# Patient Record
Sex: MALE | Race: BLACK OR AFRICAN AMERICAN | Employment: FULL TIME | RURAL
[De-identification: names, ages, dates, MRNs, and addresses within clinical notes are randomized per-mention and may not be internally consistent; named-entity substitution may affect disease eponyms.]

---

## 2024-04-11 ENCOUNTER — APPOINTMENT (OUTPATIENT)
Facility: HOSPITAL | Age: 49
End: 2024-04-11
Payer: COMMERCIAL

## 2024-04-11 ENCOUNTER — HOSPITAL ENCOUNTER (EMERGENCY)
Facility: HOSPITAL | Age: 49
Discharge: HOME | End: 2024-04-11
Attending: EMERGENCY MEDICINE
Payer: COMMERCIAL

## 2024-04-11 VITALS
HEART RATE: 69 BPM | HEIGHT: 73 IN | DIASTOLIC BLOOD PRESSURE: 86 MMHG | WEIGHT: 210 LBS | SYSTOLIC BLOOD PRESSURE: 134 MMHG | TEMPERATURE: 98 F | BODY MASS INDEX: 27.83 KG/M2 | OXYGEN SATURATION: 100 % | RESPIRATION RATE: 17 BRPM

## 2024-04-11 DIAGNOSIS — R55 NEAR SYNCOPE: Primary | ICD-10-CM

## 2024-04-11 LAB
ALBUMIN SERPL-MCNC: 4 G/DL (ref 3.5–5)
ALBUMIN/GLOB SERPL: 1 (ref 1.1–2.2)
ALP SERPL-CCNC: 87 U/L (ref 45–117)
ALT SERPL-CCNC: 29 U/L (ref 12–78)
ANION GAP SERPL CALC-SCNC: 9 MMOL/L (ref 5–15)
AST SERPL-CCNC: 23 U/L (ref 15–37)
BASOPHILS # BLD: 0.1 K/UL (ref 0–0.1)
BASOPHILS NFR BLD: 1 % (ref 0–1)
BILIRUB SERPL-MCNC: 0.4 MG/DL (ref 0.2–1)
BUN SERPL-MCNC: 18 MG/DL (ref 6–20)
BUN/CREAT SERPL: 17 (ref 12–20)
CALCIUM SERPL-MCNC: 8.7 MG/DL (ref 8.5–10.1)
CHLORIDE SERPL-SCNC: 97 MMOL/L (ref 97–108)
CO2 SERPL-SCNC: 27 MMOL/L (ref 21–32)
CREAT SERPL-MCNC: 1.05 MG/DL (ref 0.7–1.3)
DIFFERENTIAL METHOD BLD: ABNORMAL
EOSINOPHIL # BLD: 0.3 K/UL (ref 0–0.4)
EOSINOPHIL NFR BLD: 3 % (ref 0–7)
ERYTHROCYTE [DISTWIDTH] IN BLOOD BY AUTOMATED COUNT: 13.6 % (ref 11.5–14.5)
GLOBULIN SER CALC-MCNC: 4.1 G/DL (ref 2–4)
GLUCOSE SERPL-MCNC: 122 MG/DL (ref 65–100)
HCT VFR BLD AUTO: 44 % (ref 36.6–50.3)
HGB BLD-MCNC: 14.8 G/DL (ref 12.1–17)
IMM GRANULOCYTES # BLD AUTO: 0.1 K/UL (ref 0–0.04)
IMM GRANULOCYTES NFR BLD AUTO: 1 % (ref 0–0.5)
LYMPHOCYTES # BLD: 2.9 K/UL (ref 0.8–3.5)
LYMPHOCYTES NFR BLD: 38 % (ref 12–49)
MCH RBC QN AUTO: 26.4 PG (ref 26–34)
MCHC RBC AUTO-ENTMCNC: 33.6 G/DL (ref 30–36.5)
MCV RBC AUTO: 78.4 FL (ref 80–99)
MONOCYTES # BLD: 0.6 K/UL (ref 0–1)
MONOCYTES NFR BLD: 8 % (ref 5–13)
NEUTS SEG # BLD: 3.7 K/UL (ref 1.8–8)
NEUTS SEG NFR BLD: 49 % (ref 32–75)
NRBC # BLD: 0 K/UL (ref 0–0.01)
NRBC BLD-RTO: 0 PER 100 WBC
PLATELET # BLD AUTO: 237 K/UL (ref 150–400)
PMV BLD AUTO: 10.1 FL (ref 8.9–12.9)
POTASSIUM SERPL-SCNC: 3.8 MMOL/L (ref 3.5–5.1)
PROT SERPL-MCNC: 8.1 G/DL (ref 6.4–8.2)
RBC # BLD AUTO: 5.61 M/UL (ref 4.1–5.7)
SODIUM SERPL-SCNC: 133 MMOL/L (ref 136–145)
TROPONIN I SERPL HS-MCNC: 87 NG/L (ref 0–76)
TROPONIN I SERPL HS-MCNC: 90 NG/L (ref 0–76)
TSH SERPL DL<=0.05 MIU/L-ACNC: 0.94 UIU/ML (ref 0.36–3.74)
WBC # BLD AUTO: 7.6 K/UL (ref 4.1–11.1)

## 2024-04-11 PROCEDURE — 84443 ASSAY THYROID STIM HORMONE: CPT

## 2024-04-11 PROCEDURE — 80053 COMPREHEN METABOLIC PANEL: CPT

## 2024-04-11 PROCEDURE — 36415 COLL VENOUS BLD VENIPUNCTURE: CPT

## 2024-04-11 PROCEDURE — 84484 ASSAY OF TROPONIN QUANT: CPT

## 2024-04-11 PROCEDURE — 93005 ELECTROCARDIOGRAM TRACING: CPT | Performed by: EMERGENCY MEDICINE

## 2024-04-11 PROCEDURE — 70450 CT HEAD/BRAIN W/O DYE: CPT

## 2024-04-11 PROCEDURE — 85025 COMPLETE CBC W/AUTO DIFF WBC: CPT

## 2024-04-11 PROCEDURE — 99284 EMERGENCY DEPT VISIT MOD MDM: CPT

## 2024-04-11 ASSESSMENT — PAIN SCALES - GENERAL: PAINLEVEL_OUTOF10: 0

## 2024-04-11 ASSESSMENT — LIFESTYLE VARIABLES
HOW MANY STANDARD DRINKS CONTAINING ALCOHOL DO YOU HAVE ON A TYPICAL DAY: PATIENT DOES NOT DRINK
HOW OFTEN DO YOU HAVE A DRINK CONTAINING ALCOHOL: NEVER

## 2024-04-11 ASSESSMENT — PAIN - FUNCTIONAL ASSESSMENT: PAIN_FUNCTIONAL_ASSESSMENT: 0-10

## 2024-04-11 NOTE — ED PROVIDER NOTES
[facility] EMERGENCY DEP  EMERGENCY DEPARTMENT ENCOUNTER       Pt Name: Otis Andrade  MRN: [id number]  Birthdate 1975  Date of evaluation: 4/11/2024  Provider: Kojo Mehta MD   PCP: None, None  Note Started: 1:55 PM 4/11/24     CHIEF COMPLAINT       Chief Complaint   Patient presents with    Hypertension        HISTORY OF PRESENT ILLNESS: 1 or more elements      History From: Patient, History limited by: none     Otis Andrade is a pleasant 48 y.o. male who presents by car for evaluation of hypertension.  Patient awoke at 430 this morning, had breakfast, went to work at [employer].  Patient was loading boxes into trucks, suddenly began feeling \"strange, tingly\" but had no headache, chest pain, difficulty walking.  Told coworkers that he did not feel well and was walking to an area to sit down, a coworker thought that his speech was slurred and that 1 side of his face was not moving normally but he began drinking water and immediately returned to normal.  911 had been called, they evaluated the patient, recommended that he come to the ER for evaluation, I coworker drove him to the ER.  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  Patient now feels back to normal, no symptoms, no discomfort, no dysfunction.  EKG by EMS was normal.     Nursing Notes were all reviewed and agreed with or any disagreements were addressed in the HPI.     REVIEW OF SYSTEMS        Positives and Pertinent negatives as per HPI.    PAST HISTORY     Past Medical History:  History reviewed. No pertinent past medical history.    Past Surgical History:  History reviewed. No pertinent surgical history.    Family History:  History reviewed. No pertinent family history.    Social History:  Social History     Tobacco Use    Smoking status: Never     Passive exposure: Never    Smokeless tobacco: Never   Substance Use Topics    Alcohol use:

## 2024-04-11 NOTE — ED TRIAGE NOTES
Pt arrived with complaint of hypertension.  Pt reports he was at work and between [id number] and he felt lightheaded and his co-worker stated pts speech was slurred and the pts face did not look right. EMS was called and told pt that his blood pressure was extremely elevated and needed to seek medical attention. Pt woke up at 0430 feeling well.  Pt is awake alert and oriented X 4,  BFAST in triage negative.  Pt speaking in full complete sentences  NAD.  Pt denies hx of hypertension and is not on medications for blood pressure.  Pt denies CP/SOB at time of lightheadedness and continues to deny.  Pt educated on ER flow.  Provider notified of this patient

## 2024-04-14 LAB
EKG ATRIAL RATE: 62 BPM
EKG ATRIAL RATE: 68 BPM
EKG DIAGNOSIS: NORMAL
EKG DIAGNOSIS: NORMAL
EKG P AXIS: 60 DEGREES
EKG P AXIS: 68 DEGREES
EKG P-R INTERVAL: 124 MS
EKG P-R INTERVAL: 126 MS
EKG Q-T INTERVAL: 374 MS
EKG Q-T INTERVAL: 398 MS
EKG QRS DURATION: 84 MS
EKG QRS DURATION: 98 MS
EKG QTC CALCULATION (BAZETT): 397 MS
EKG QTC CALCULATION (BAZETT): 403 MS
EKG R AXIS: 19 DEGREES
EKG R AXIS: 27 DEGREES
EKG T AXIS: 28 DEGREES
EKG T AXIS: 30 DEGREES
EKG VENTRICULAR RATE: 62 BPM
EKG VENTRICULAR RATE: 68 BPM

## 2025-01-25 ENCOUNTER — HOSPITAL ENCOUNTER (EMERGENCY)
Facility: HOSPITAL | Age: 50
Discharge: HOME | End: 2025-01-25
Attending: EMERGENCY MEDICINE
Payer: COMMERCIAL

## 2025-01-25 VITALS
SYSTOLIC BLOOD PRESSURE: 167 MMHG | DIASTOLIC BLOOD PRESSURE: 99 MMHG | HEIGHT: 73 IN | TEMPERATURE: 98.2 F | BODY MASS INDEX: 27.83 KG/M2 | OXYGEN SATURATION: 99 % | HEART RATE: 83 BPM | WEIGHT: 210 LBS | RESPIRATION RATE: 14 BRPM

## 2025-01-25 DIAGNOSIS — R03.0 ELEVATED BLOOD PRESSURE READING: ICD-10-CM

## 2025-01-25 DIAGNOSIS — K04.7 DENTAL ABSCESS: Primary | ICD-10-CM

## 2025-01-25 PROCEDURE — 6360000002 HC RX W HCPCS: Performed by: EMERGENCY MEDICINE

## 2025-01-25 PROCEDURE — 99283 EMERGENCY DEPT VISIT LOW MDM: CPT

## 2025-01-25 PROCEDURE — 41800 DRAINAGE OF GUM LESION: CPT

## 2025-01-25 RX ORDER — BUPIVACAINE HYDROCHLORIDE 5 MG/ML
10 INJECTION, SOLUTION EPIDURAL; INTRACAUDAL
Status: COMPLETED | OUTPATIENT
Start: 2025-01-25 | End: 2025-01-25

## 2025-01-25 RX ORDER — IBUPROFEN 600 MG/1
600 TABLET, FILM COATED ORAL EVERY 6 HOURS PRN
Qty: 25 TABLET | Refills: 0 | Status: SHIPPED | OUTPATIENT
Start: 2025-01-25

## 2025-01-25 RX ORDER — CHLORHEXIDINE GLUCONATE ORAL RINSE 1.2 MG/ML
15 SOLUTION DENTAL 2 TIMES DAILY
Qty: 420 ML | Refills: 0 | Status: SHIPPED | OUTPATIENT
Start: 2025-01-25 | End: 2025-02-08

## 2025-01-25 RX ORDER — OXYCODONE HYDROCHLORIDE 5 MG/1
5 TABLET ORAL EVERY 6 HOURS PRN
Qty: 8 TABLET | Refills: 0 | Status: SHIPPED | OUTPATIENT
Start: 2025-01-25 | End: 2025-01-28

## 2025-01-25 RX ADMIN — BUPIVACAINE HYDROCHLORIDE 50 MG: 5 INJECTION, SOLUTION EPIDURAL; INTRACAUDAL; PERINEURAL at 07:58

## 2025-01-25 ASSESSMENT — LIFESTYLE VARIABLES
HOW OFTEN DO YOU HAVE A DRINK CONTAINING ALCOHOL: MONTHLY OR LESS
HOW MANY STANDARD DRINKS CONTAINING ALCOHOL DO YOU HAVE ON A TYPICAL DAY: 1 OR 2

## 2025-01-25 ASSESSMENT — PAIN DESCRIPTION - ORIENTATION: ORIENTATION: UPPER;RIGHT

## 2025-01-25 ASSESSMENT — PAIN DESCRIPTION - LOCATION: LOCATION: MOUTH

## 2025-01-25 ASSESSMENT — PAIN SCALES - GENERAL: PAINLEVEL_OUTOF10: 5

## 2025-01-25 ASSESSMENT — PAIN DESCRIPTION - DESCRIPTORS: DESCRIPTORS: ACHING

## 2025-01-25 NOTE — DISCHARGE INSTRUCTIONS
Emergency Dental Care     [facility]  [address]; [place], [place] [address]  Open M-F: 8AM - 5PM  Main Phone: [phone]  Pediatric Phone: [phone]  $70 for Emergency Care  $60 for first routine care, then pay by sliding scale based upon income.    [facility]  [address]; [place] [address]  Phone: [phone]    [employer]  [facility]: [address]; [place] [address]  [facility]: [address] [place] [address]; [place] [address]  Phone: [phone]  Open Monday - Friday 8AM-4:30 PM  Emergency Walk-In Care: Monday-Thursday 8 AM-12 PM    [facility]  [facility], [facility], [address] [place] [address], 1st Floor  First Come First Service starting at 8:30 AM M-F  Phone: [phone], press 2  Fee: $150 per tooth (x-ray & extractions only)  Pediatrics Phone: [phone], 8-5 M-F    [facility]  [facility], [facility], [address] [place] [address], 2nd Floor, [address]; [place], [place]  First Come First Service starting at 8:30 AM-3 PM M - F    [facility]  [address] [place] [address]  Phone: [phone] or [phone]  Emergency Hours: 9:30AM - 11AM (extractions)  Simple tooth extraction $ per tooth. $75 for x-ray    [facility]  [address] [place] [address]  [facility] Residents only, over the age of 18  Phone: [phone]. Leave message saying you need an appointment to register.  Hours: Tuesday Evenings

## 2025-01-25 NOTE — ED PROVIDER NOTES
EMERGENCY DEPARTMENT HISTORY AND PHYSICAL EXAM      Date: 1/25/2025  Patient Name: Otis Andrade    History of Presenting Illness     Chief Complaint   Patient presents with    Dental Problem       History Provided By: Patient    HPI: Otis Andrade, 49 y.o. male with PMHx as noted below presents emergency department for evaluation of possible dental abscess.  Patient is noted area of swelling and pain on his hard palate adjacent to right upper molars.  Otherwise denies any trismus, fevers or difficulty swallowing    PCP: None, None    No current facility-administered medications for this encounter.     Current Outpatient Medications   Medication Sig Dispense Refill    oxyCODONE (ROXICODONE) 5 MG immediate release tablet Take 1 tablet by mouth every 6 hours as needed for Pain for up to 3 days. Intended supply: 3 days. Take lowest dose possible to manage pain Max Daily Amount: 20 mg 8 tablet 0    ibuprofen (IBU) 600 MG tablet Take 1 tablet by mouth every 6 hours as needed for Pain 25 tablet 0    amoxicillin-clavulanate (AUGMENTIN) 875-125 MG per tablet Take 1 tablet by mouth 2 times daily for 7 days 14 tablet 0    chlorhexidine (PERIDEX) 0.12 % solution Swish and spit 15 mLs 2 times daily for 14 days 420 mL 0       Past History     Past Medical History:  History reviewed. No pertinent past medical history.    Past Surgical History:  History reviewed. No pertinent surgical history.    Family History:  History reviewed. No pertinent family history.    Social History:  Social History     Tobacco Use    Smoking status: Never     Passive exposure: Never    Smokeless tobacco: Never   Substance Use Topics    Alcohol use: Not Currently    Drug use: Never       Allergies:  No Known Allergies      Review of Systems   Review of Systems      Physical Exam   Physical Exam    GENERAL: alert and oriented, no acute distress  EYES: PEERL, No injection, discharge or icterus.  ENT: Mucous membranes pink and moist.  There is area of  purulent drainage was expressed.  Wound probed.   Estimated blood loss: minimal  The procedure took 1-15 minutes, and pt tolerated well.        PROGRESS  Otis Andrade has been counseled regarding his diagnosis.  He verbally conveys understanding and agreement of the signs, symptoms, diagnosis, treatment and prognosis and additionally agrees to follow up as recommended ..  He also agrees with the care-plan and conveys that all of his questions have been answered.  I have also put together some discharge instructions for him that include: 1) educational information regarding their diagnosis, 2) how to care for their diagnosis at home, as well a 3) list of reasons why they would want to return to the ED prior to their follow-up appointment, should their condition change.      Disposition:  home    PLAN:  1. DISCHARGE MEDICATIONS:  Discharge Medication List as of 1/25/2025  8:03 AM             Details   oxyCODONE (ROXICODONE) 5 MG immediate release tablet Take 1 tablet by mouth every 6 hours as needed for Pain for up to 3 days. Intended supply: 3 days. Take lowest dose possible to manage pain Max Daily Amount: 20 mg, Disp-8 tablet, R-0Normal      ibuprofen (IBU) 600 MG tablet Take 1 tablet by mouth every 6 hours as needed for Pain, Disp-25 tablet, R-0Normal      amoxicillin-clavulanate (AUGMENTIN) 875-125 MG per tablet Take 1 tablet by mouth 2 times daily for 7 days, Disp-14 tablet, R-0Normal             DISCONTINUED MEDICATIONS:  Discharge Medication List as of 1/25/2025  8:03 AM          PATIENT REFERRED TO:  Follow Up with:  [facility] Emergency Department  [address]  [facility] [address]  [phone]    If symptoms worsen    [facility]  [address]  [facility] [address]  [phone]  Schedule an appointment as soon as possible for a visit         Return to ED if worse     Diagnosis     Clinical Impression:   1. Dental abscess    2. Elevated blood pressure

## 2025-06-17 ENCOUNTER — APPOINTMENT (OUTPATIENT)
Facility: HOSPITAL | Age: 50
DRG: 066 | End: 2025-06-17
Payer: COMMERCIAL

## 2025-06-17 ENCOUNTER — HOSPITAL ENCOUNTER (INPATIENT)
Facility: HOSPITAL | Age: 50
LOS: 1 days | Discharge: ANOTHER HOSPITAL | DRG: 066 | End: 2025-06-18
Attending: EMERGENCY MEDICINE | Admitting: INTERNAL MEDICINE
Payer: COMMERCIAL

## 2025-06-17 DIAGNOSIS — I63.9 CEREBROVASCULAR ACCIDENT (CVA), UNSPECIFIED MECHANISM (HCC): ICD-10-CM

## 2025-06-17 DIAGNOSIS — R93.0 ABNORMAL CT OF THE HEAD: ICD-10-CM

## 2025-06-17 DIAGNOSIS — H53.9 VISUAL DISTURBANCE: Primary | ICD-10-CM

## 2025-06-17 DIAGNOSIS — R03.0 ELEVATED BLOOD PRESSURE READING: ICD-10-CM

## 2025-06-17 LAB
ALBUMIN SERPL-MCNC: 4.2 G/DL (ref 3.5–5)
ALBUMIN/GLOB SERPL: 1.1 (ref 1.1–2.2)
ALP SERPL-CCNC: 66 U/L (ref 45–117)
ALT SERPL-CCNC: 35 U/L (ref 12–78)
ANION GAP SERPL CALC-SCNC: 10 MMOL/L (ref 2–12)
APPEARANCE UR: CLEAR
AST SERPL-CCNC: 20 U/L (ref 15–37)
BACTERIA URNS QL MICRO: NEGATIVE /HPF
BASOPHILS # BLD: 0.04 K/UL (ref 0–0.1)
BASOPHILS NFR BLD: 0.4 % (ref 0–1)
BILIRUB SERPL-MCNC: 0.7 MG/DL (ref 0.2–1)
BILIRUB UR QL: NEGATIVE
BUN SERPL-MCNC: 15 MG/DL (ref 6–20)
BUN/CREAT SERPL: 14 (ref 12–20)
CALCIUM SERPL-MCNC: 9.4 MG/DL (ref 8.5–10.1)
CHLORIDE SERPL-SCNC: 101 MMOL/L (ref 97–108)
CO2 SERPL-SCNC: 28 MMOL/L (ref 21–32)
COLOR UR: NORMAL
CREAT SERPL-MCNC: 1.04 MG/DL (ref 0.7–1.3)
DIFFERENTIAL METHOD BLD: ABNORMAL
EOSINOPHIL # BLD: 0.15 K/UL (ref 0–0.4)
EOSINOPHIL NFR BLD: 1.6 % (ref 0–7)
EPITH CASTS URNS QL MICRO: NORMAL /LPF
ERYTHROCYTE [DISTWIDTH] IN BLOOD BY AUTOMATED COUNT: 14.2 % (ref 11.5–14.5)
GLOBULIN SER CALC-MCNC: 3.9 G/DL (ref 2–4)
GLUCOSE BLD STRIP.AUTO-MCNC: 112 MG/DL (ref 65–117)
GLUCOSE SERPL-MCNC: 107 MG/DL (ref 65–100)
GLUCOSE UR STRIP.AUTO-MCNC: NEGATIVE MG/DL
HCT VFR BLD AUTO: 42.4 % (ref 36.6–50.3)
HGB BLD-MCNC: 14.1 G/DL (ref 12.1–17)
HGB UR QL STRIP: NEGATIVE
IMM GRANULOCYTES # BLD AUTO: 0.03 K/UL (ref 0–0.04)
IMM GRANULOCYTES NFR BLD AUTO: 0.3 % (ref 0–0.5)
KETONES UR QL STRIP.AUTO: NEGATIVE MG/DL
LEUKOCYTE ESTERASE UR QL STRIP.AUTO: NEGATIVE
LYMPHOCYTES # BLD: 2.66 K/UL (ref 0.8–3.5)
LYMPHOCYTES NFR BLD: 27.6 % (ref 12–49)
MAGNESIUM SERPL-MCNC: 1.9 MG/DL (ref 1.6–2.4)
MCH RBC QN AUTO: 26.5 PG (ref 26–34)
MCHC RBC AUTO-ENTMCNC: 33.3 G/DL (ref 30–36.5)
MCV RBC AUTO: 79.5 FL (ref 80–99)
MONOCYTES # BLD: 0.85 K/UL (ref 0–1)
MONOCYTES NFR BLD: 8.8 % (ref 5–13)
NEUTS SEG # BLD: 5.91 K/UL (ref 1.8–8)
NEUTS SEG NFR BLD: 61.3 % (ref 32–75)
NITRITE UR QL STRIP.AUTO: NEGATIVE
NRBC # BLD: 0 K/UL (ref 0–0.01)
NRBC BLD-RTO: 0 PER 100 WBC
PH UR STRIP: 6 (ref 5–8)
PLATELET # BLD AUTO: 238 K/UL (ref 150–400)
PMV BLD AUTO: 10.2 FL (ref 8.9–12.9)
POTASSIUM SERPL-SCNC: 4.2 MMOL/L (ref 3.5–5.1)
PROT SERPL-MCNC: 8.1 G/DL (ref 6.4–8.2)
PROT UR STRIP-MCNC: NEGATIVE MG/DL
RBC # BLD AUTO: 5.33 M/UL (ref 4.1–5.7)
RBC #/AREA URNS HPF: NORMAL /HPF (ref 0–5)
SERVICE CMNT-IMP: NORMAL
SODIUM SERPL-SCNC: 139 MMOL/L (ref 136–145)
SP GR UR REFRACTOMETRY: 1.02 (ref 1–1.03)
TROPONIN I SERPL HS-MCNC: 79 NG/L (ref 0–76)
URINE CULTURE IF INDICATED: NORMAL
UROBILINOGEN UR QL STRIP.AUTO: 0.2 EU/DL (ref 0.2–1)
WBC # BLD AUTO: 9.6 K/UL (ref 4.1–11.1)
WBC URNS QL MICRO: NORMAL /HPF (ref 0–4)

## 2025-06-17 PROCEDURE — 36415 COLL VENOUS BLD VENIPUNCTURE: CPT

## 2025-06-17 PROCEDURE — 6370000000 HC RX 637 (ALT 250 FOR IP): Performed by: EMERGENCY MEDICINE

## 2025-06-17 PROCEDURE — 99285 EMERGENCY DEPT VISIT HI MDM: CPT

## 2025-06-17 PROCEDURE — 80053 COMPREHEN METABOLIC PANEL: CPT

## 2025-06-17 PROCEDURE — 71046 X-RAY EXAM CHEST 2 VIEWS: CPT

## 2025-06-17 PROCEDURE — 82962 GLUCOSE BLOOD TEST: CPT

## 2025-06-17 PROCEDURE — 93005 ELECTROCARDIOGRAM TRACING: CPT | Performed by: EMERGENCY MEDICINE

## 2025-06-17 PROCEDURE — 70450 CT HEAD/BRAIN W/O DYE: CPT

## 2025-06-17 PROCEDURE — 81001 URINALYSIS AUTO W/SCOPE: CPT

## 2025-06-17 PROCEDURE — 85025 COMPLETE CBC W/AUTO DIFF WBC: CPT

## 2025-06-17 PROCEDURE — 84484 ASSAY OF TROPONIN QUANT: CPT

## 2025-06-17 PROCEDURE — 83735 ASSAY OF MAGNESIUM: CPT

## 2025-06-17 RX ORDER — ASPIRIN 81 MG/1
324 TABLET, CHEWABLE ORAL ONCE
Status: COMPLETED | OUTPATIENT
Start: 2025-06-17 | End: 2025-06-17

## 2025-06-17 RX ADMIN — ASPIRIN 324 MG: 81 TABLET, CHEWABLE ORAL at 23:38

## 2025-06-17 ASSESSMENT — LIFESTYLE VARIABLES
HOW MANY STANDARD DRINKS CONTAINING ALCOHOL DO YOU HAVE ON A TYPICAL DAY: 1 OR 2
HOW MANY STANDARD DRINKS CONTAINING ALCOHOL DO YOU HAVE ON A TYPICAL DAY: 1 OR 2
HOW OFTEN DO YOU HAVE A DRINK CONTAINING ALCOHOL: MONTHLY OR LESS

## 2025-06-17 ASSESSMENT — PAIN SCALES - GENERAL: PAINLEVEL_OUTOF10: 0

## 2025-06-17 ASSESSMENT — VISUAL ACUITY
OU: 20/20
OD: 20/20
OS: 20/20

## 2025-06-17 ASSESSMENT — PAIN - FUNCTIONAL ASSESSMENT: PAIN_FUNCTIONAL_ASSESSMENT: 0-10

## 2025-06-17 NOTE — ED TRIAGE NOTES
Pt reports that he was at work in the [employer] around 1845 and developed an anxious, jittery feeling with blurred vision that lasted x 15-20 minutes. Denies blurred vision/anxiety at this time. Reports that he still feels jittery.

## 2025-06-18 ENCOUNTER — HOSPITAL ENCOUNTER (INPATIENT)
Facility: HOSPITAL | Age: 50
LOS: 2 days | Discharge: HOME | DRG: 064 | End: 2025-06-20
Attending: STUDENT IN AN ORGANIZED HEALTH CARE EDUCATION/TRAINING PROGRAM | Admitting: STUDENT IN AN ORGANIZED HEALTH CARE EDUCATION/TRAINING PROGRAM
Payer: COMMERCIAL

## 2025-06-18 ENCOUNTER — APPOINTMENT (OUTPATIENT)
Facility: HOSPITAL | Age: 50
DRG: 066 | End: 2025-06-18
Payer: COMMERCIAL

## 2025-06-18 VITALS
SYSTOLIC BLOOD PRESSURE: 159 MMHG | OXYGEN SATURATION: 97 % | BODY MASS INDEX: 27.95 KG/M2 | DIASTOLIC BLOOD PRESSURE: 95 MMHG | WEIGHT: 210.9 LBS | HEIGHT: 73 IN | TEMPERATURE: 97.9 F | HEART RATE: 62 BPM | RESPIRATION RATE: 18 BRPM

## 2025-06-18 DIAGNOSIS — D49.89 CARDIAC TUMOR: Primary | ICD-10-CM

## 2025-06-18 PROBLEM — H53.9 VISUAL DISTURBANCE: Status: ACTIVE | Noted: 2025-06-18

## 2025-06-18 PROBLEM — I50.810 RIGHT HEART FAILURE (HCC): Status: ACTIVE | Noted: 2025-06-18

## 2025-06-18 PROBLEM — E78.5 HYPERLIPIDEMIA: Status: ACTIVE | Noted: 2025-06-18

## 2025-06-18 PROBLEM — I34.0 MITRAL INSUFFICIENCY: Status: ACTIVE | Noted: 2025-06-18

## 2025-06-18 PROBLEM — I63.9 ACUTE CEREBROVASCULAR ACCIDENT (CVA) (HCC): Status: ACTIVE | Noted: 2025-06-18

## 2025-06-18 PROBLEM — I05.8 MITRAL VALVE MASS: Status: ACTIVE | Noted: 2025-06-18

## 2025-06-18 PROBLEM — N50.89 TESTICULAR MASS: Status: RESOLVED | Noted: 2022-08-08 | Resolved: 2025-06-18

## 2025-06-18 LAB
ANION GAP SERPL CALC-SCNC: 10 MMOL/L (ref 2–12)
BUN SERPL-MCNC: 12 MG/DL (ref 6–20)
BUN/CREAT SERPL: 13 (ref 12–20)
CALCIUM SERPL-MCNC: 9 MG/DL (ref 8.5–10.1)
CHLORIDE SERPL-SCNC: 104 MMOL/L (ref 97–108)
CHOLEST SERPL-MCNC: 195 MG/DL
CO2 SERPL-SCNC: 26 MMOL/L (ref 21–32)
CREAT SERPL-MCNC: 0.93 MG/DL (ref 0.7–1.3)
ECHO AO ROOT DIAM: 3.5 CM
ECHO AO ROOT INDEX: 1.59 CM/M2
ECHO AR MAX VEL PISA: 4.8 M/S
ECHO AV PEAK GRADIENT: 7 MMHG
ECHO AV PEAK VELOCITY: 1.3 M/S
ECHO AV REGURGITANT PHT: 613.5 MS
ECHO BSA: 2.24 M2
ECHO EST RA PRESSURE: 3 MMHG
ECHO LA DIAMETER INDEX: 1.55 CM/M2
ECHO LA DIAMETER: 3.4 CM
ECHO LA TO AORTIC ROOT RATIO: 0.97
ECHO LA VOL A-L A2C: 92 ML (ref 18–58)
ECHO LA VOL A-L A4C: 75 ML (ref 18–58)
ECHO LA VOL MOD A2C: 90 ML (ref 18–58)
ECHO LA VOL MOD A4C: 73 ML (ref 18–58)
ECHO LA VOLUME AREA LENGTH: 86 ML
ECHO LA VOLUME INDEX A-L A2C: 42 ML/M2 (ref 16–34)
ECHO LA VOLUME INDEX A-L A4C: 34 ML/M2 (ref 16–34)
ECHO LA VOLUME INDEX AREA LENGTH: 39 ML/M2 (ref 16–34)
ECHO LA VOLUME INDEX MOD A2C: 41 ML/M2 (ref 16–34)
ECHO LA VOLUME INDEX MOD A4C: 33 ML/M2 (ref 16–34)
ECHO LV E' LATERAL VELOCITY: 10.72 CM/S
ECHO LV E' SEPTAL VELOCITY: 6.99 CM/S
ECHO LV EDV A2C: 106 ML
ECHO LV EDV A4C: 129 ML
ECHO LV EDV BP: 117 ML (ref 67–155)
ECHO LV EDV INDEX A4C: 59 ML/M2
ECHO LV EDV INDEX BP: 53 ML/M2
ECHO LV EDV NDEX A2C: 48 ML/M2
ECHO LV EF PHYSICIAN: 60 %
ECHO LV EJECTION FRACTION A2C: 61 %
ECHO LV EJECTION FRACTION A4C: 56 %
ECHO LV EJECTION FRACTION BIPLANE: 57 % (ref 55–100)
ECHO LV ESV A2C: 41 ML
ECHO LV ESV A4C: 57 ML
ECHO LV ESV BP: 51 ML (ref 22–58)
ECHO LV ESV INDEX A2C: 19 ML/M2
ECHO LV ESV INDEX A4C: 26 ML/M2
ECHO LV ESV INDEX BP: 23 ML/M2
ECHO LV FRACTIONAL SHORTENING: 33 % (ref 28–44)
ECHO LV INTERNAL DIMENSION DIASTOLE INDEX: 2.73 CM/M2
ECHO LV INTERNAL DIMENSION DIASTOLIC: 6 CM (ref 4.2–5.9)
ECHO LV INTERNAL DIMENSION SYSTOLIC INDEX: 1.82 CM/M2
ECHO LV INTERNAL DIMENSION SYSTOLIC: 4 CM
ECHO LV IVSD: 0.9 CM (ref 0.6–1)
ECHO LV MASS 2D: 246.9 G (ref 88–224)
ECHO LV MASS INDEX 2D: 112.2 G/M2 (ref 49–115)
ECHO LV POSTERIOR WALL DIASTOLIC: 1.1 CM (ref 0.6–1)
ECHO LV RELATIVE WALL THICKNESS RATIO: 0.37
ECHO LVOT AREA: 3.5 CM2
ECHO LVOT DIAM: 2.1 CM
ECHO MV A VELOCITY: 0.61 M/S
ECHO MV E DECELERATION TIME (DT): 244.1 MS
ECHO MV E VELOCITY: 0.62 M/S
ECHO MV E/A RATIO: 1.02
ECHO MV E/E' LATERAL: 5.78
ECHO MV E/E' RATIO (AVERAGED): 7.33
ECHO MV E/E' SEPTAL: 8.87
ECHO PULMONARY ARTERY SYSTOLIC PRESSURE (PASP): 16 MMHG
ECHO PV MAX VELOCITY: 0.8 M/S
ECHO PV PEAK GRADIENT: 3 MMHG
ECHO RA VOLUME: 76 ML
ECHO RA VOLUME: 87 ML
ECHO RIGHT VENTRICULAR SYSTOLIC PRESSURE (RVSP): 16 MMHG
ECHO RV BASAL DIMENSION: 4.8 CM
ECHO RV FREE WALL PEAK S': 15.3 CM/S
ECHO TV REGURGITANT MAX VELOCITY: 1.81 M/S
ECHO TV REGURGITANT PEAK GRADIENT: 13 MMHG
EST. AVERAGE GLUCOSE BLD GHB EST-MCNC: 103 MG/DL
GLUCOSE SERPL-MCNC: 98 MG/DL (ref 65–100)
HBA1C MFR BLD: 5.2 % (ref 4–5.6)
HDLC SERPL-MCNC: 74 MG/DL
HDLC SERPL: 2.6 (ref 0–5)
LDLC SERPL CALC-MCNC: 111.4 MG/DL (ref 0–100)
POTASSIUM SERPL-SCNC: 3.9 MMOL/L (ref 3.5–5.1)
SODIUM SERPL-SCNC: 140 MMOL/L (ref 136–145)
TRIGL SERPL-MCNC: 48 MG/DL
TROPONIN I SERPL HS-MCNC: 73 NG/L (ref 0–76)
VLDLC SERPL CALC-MCNC: 9.6 MG/DL

## 2025-06-18 PROCEDURE — 2500000003 HC RX 250 WO HCPCS: Performed by: HOSPITALIST

## 2025-06-18 PROCEDURE — G0378 HOSPITAL OBSERVATION PER HR: HCPCS

## 2025-06-18 PROCEDURE — 83036 HEMOGLOBIN GLYCOSYLATED A1C: CPT

## 2025-06-18 PROCEDURE — 6360000002 HC RX W HCPCS: Performed by: HOSPITALIST

## 2025-06-18 PROCEDURE — 36415 COLL VENOUS BLD VENIPUNCTURE: CPT

## 2025-06-18 PROCEDURE — 6370000000 HC RX 637 (ALT 250 FOR IP): Performed by: HOSPITALIST

## 2025-06-18 PROCEDURE — 70553 MRI BRAIN STEM W/O & W/DYE: CPT

## 2025-06-18 PROCEDURE — 96372 THER/PROPH/DIAG INJ SC/IM: CPT

## 2025-06-18 PROCEDURE — 93306 TTE W/DOPPLER COMPLETE: CPT | Performed by: INTERNAL MEDICINE

## 2025-06-18 PROCEDURE — A9577 INJ MULTIHANCE: HCPCS | Performed by: HOSPITALIST

## 2025-06-18 PROCEDURE — 6360000004 HC RX CONTRAST MEDICATION: Performed by: HOSPITALIST

## 2025-06-18 PROCEDURE — 70544 MR ANGIOGRAPHY HEAD W/O DYE: CPT

## 2025-06-18 PROCEDURE — 97162 PT EVAL MOD COMPLEX 30 MIN: CPT

## 2025-06-18 PROCEDURE — 93306 TTE W/DOPPLER COMPLETE: CPT

## 2025-06-18 PROCEDURE — 1100000003 HC PRIVATE W/ TELEMETRY

## 2025-06-18 PROCEDURE — 70548 MR ANGIOGRAPHY NECK W/DYE: CPT

## 2025-06-18 PROCEDURE — 80048 BASIC METABOLIC PNL TOTAL CA: CPT

## 2025-06-18 PROCEDURE — 80061 LIPID PANEL: CPT

## 2025-06-18 RX ORDER — CLOPIDOGREL BISULFATE 75 MG/1
75 TABLET ORAL DAILY
Status: DISCONTINUED | OUTPATIENT
Start: 2025-06-19 | End: 2025-06-20 | Stop reason: HOSPADM

## 2025-06-18 RX ORDER — SODIUM CHLORIDE 0.9 % (FLUSH) 0.9 %
5-40 SYRINGE (ML) INJECTION PRN
Status: DISCONTINUED | OUTPATIENT
Start: 2025-06-18 | End: 2025-06-18 | Stop reason: HOSPADM

## 2025-06-18 RX ORDER — SODIUM CHLORIDE 9 MG/ML
INJECTION, SOLUTION INTRAVENOUS PRN
Status: DISCONTINUED | OUTPATIENT
Start: 2025-06-18 | End: 2025-06-18 | Stop reason: HOSPADM

## 2025-06-18 RX ORDER — ASPIRIN 300 MG/1
300 SUPPOSITORY RECTAL DAILY
Status: CANCELLED | OUTPATIENT
Start: 2025-06-19

## 2025-06-18 RX ORDER — ACETAMINOPHEN 650 MG/1
650 SUPPOSITORY RECTAL EVERY 6 HOURS PRN
Status: DISCONTINUED | OUTPATIENT
Start: 2025-06-18 | End: 2025-06-20 | Stop reason: HOSPADM

## 2025-06-18 RX ORDER — POLYETHYLENE GLYCOL 3350 17 G/17G
17 POWDER, FOR SOLUTION ORAL DAILY PRN
Status: DISCONTINUED | OUTPATIENT
Start: 2025-06-18 | End: 2025-06-18

## 2025-06-18 RX ORDER — ONDANSETRON 4 MG/1
4 TABLET, ORALLY DISINTEGRATING ORAL EVERY 8 HOURS PRN
Status: DISCONTINUED | OUTPATIENT
Start: 2025-06-18 | End: 2025-06-20 | Stop reason: HOSPADM

## 2025-06-18 RX ORDER — ONDANSETRON 2 MG/ML
4 INJECTION INTRAMUSCULAR; INTRAVENOUS EVERY 6 HOURS PRN
Status: DISCONTINUED | OUTPATIENT
Start: 2025-06-18 | End: 2025-06-18 | Stop reason: HOSPADM

## 2025-06-18 RX ORDER — ASPIRIN 300 MG/1
300 SUPPOSITORY RECTAL DAILY
Status: DISCONTINUED | OUTPATIENT
Start: 2025-06-18 | End: 2025-06-18 | Stop reason: HOSPADM

## 2025-06-18 RX ORDER — SODIUM CHLORIDE 9 MG/ML
INJECTION, SOLUTION INTRAVENOUS PRN
Status: DISCONTINUED | OUTPATIENT
Start: 2025-06-18 | End: 2025-06-20 | Stop reason: HOSPADM

## 2025-06-18 RX ORDER — POTASSIUM CHLORIDE 1500 MG/1
40 TABLET, EXTENDED RELEASE ORAL PRN
Status: DISCONTINUED | OUTPATIENT
Start: 2025-06-18 | End: 2025-06-20 | Stop reason: HOSPADM

## 2025-06-18 RX ORDER — POLYETHYLENE GLYCOL 3350 17 G/17G
17 POWDER, FOR SOLUTION ORAL DAILY PRN
Status: CANCELLED | OUTPATIENT
Start: 2025-06-18

## 2025-06-18 RX ORDER — ENOXAPARIN SODIUM 100 MG/ML
40 INJECTION SUBCUTANEOUS DAILY
Status: CANCELLED | OUTPATIENT
Start: 2025-06-19

## 2025-06-18 RX ORDER — SODIUM CHLORIDE 9 MG/ML
INJECTION, SOLUTION INTRAVENOUS PRN
Status: CANCELLED | OUTPATIENT
Start: 2025-06-18

## 2025-06-18 RX ORDER — ASPIRIN 81 MG/1
81 TABLET, CHEWABLE ORAL DAILY
Status: DISCONTINUED | OUTPATIENT
Start: 2025-06-18 | End: 2025-06-18 | Stop reason: HOSPADM

## 2025-06-18 RX ORDER — ONDANSETRON 4 MG/1
4 TABLET, ORALLY DISINTEGRATING ORAL EVERY 8 HOURS PRN
Status: DISCONTINUED | OUTPATIENT
Start: 2025-06-18 | End: 2025-06-18 | Stop reason: HOSPADM

## 2025-06-18 RX ORDER — ACETAMINOPHEN 325 MG/1
650 TABLET ORAL EVERY 6 HOURS PRN
Status: DISCONTINUED | OUTPATIENT
Start: 2025-06-18 | End: 2025-06-20 | Stop reason: HOSPADM

## 2025-06-18 RX ORDER — POTASSIUM CHLORIDE 7.45 MG/ML
10 INJECTION INTRAVENOUS PRN
Status: DISCONTINUED | OUTPATIENT
Start: 2025-06-18 | End: 2025-06-20 | Stop reason: HOSPADM

## 2025-06-18 RX ORDER — POLYETHYLENE GLYCOL 3350 17 G/17G
17 POWDER, FOR SOLUTION ORAL DAILY PRN
Status: DISCONTINUED | OUTPATIENT
Start: 2025-06-18 | End: 2025-06-18 | Stop reason: HOSPADM

## 2025-06-18 RX ORDER — SODIUM CHLORIDE 0.9 % (FLUSH) 0.9 %
5-40 SYRINGE (ML) INJECTION EVERY 12 HOURS SCHEDULED
Status: DISCONTINUED | OUTPATIENT
Start: 2025-06-18 | End: 2025-06-20 | Stop reason: HOSPADM

## 2025-06-18 RX ORDER — ONDANSETRON 2 MG/ML
4 INJECTION INTRAMUSCULAR; INTRAVENOUS EVERY 6 HOURS PRN
Status: DISCONTINUED | OUTPATIENT
Start: 2025-06-18 | End: 2025-06-20 | Stop reason: HOSPADM

## 2025-06-18 RX ORDER — ENOXAPARIN SODIUM 100 MG/ML
40 INJECTION SUBCUTANEOUS DAILY
Status: DISCONTINUED | OUTPATIENT
Start: 2025-06-19 | End: 2025-06-20 | Stop reason: HOSPADM

## 2025-06-18 RX ORDER — MAGNESIUM SULFATE IN WATER 40 MG/ML
2000 INJECTION, SOLUTION INTRAVENOUS PRN
Status: DISCONTINUED | OUTPATIENT
Start: 2025-06-18 | End: 2025-06-20 | Stop reason: HOSPADM

## 2025-06-18 RX ORDER — SODIUM CHLORIDE 0.9 % (FLUSH) 0.9 %
5-40 SYRINGE (ML) INJECTION PRN
Status: DISCONTINUED | OUTPATIENT
Start: 2025-06-18 | End: 2025-06-20 | Stop reason: HOSPADM

## 2025-06-18 RX ORDER — HYDRALAZINE HYDROCHLORIDE 20 MG/ML
10 INJECTION INTRAMUSCULAR; INTRAVENOUS EVERY 6 HOURS PRN
Status: DISCONTINUED | OUTPATIENT
Start: 2025-06-18 | End: 2025-06-20 | Stop reason: HOSPADM

## 2025-06-18 RX ORDER — ENOXAPARIN SODIUM 100 MG/ML
40 INJECTION SUBCUTANEOUS DAILY
Status: DISCONTINUED | OUTPATIENT
Start: 2025-06-18 | End: 2025-06-18 | Stop reason: HOSPADM

## 2025-06-18 RX ORDER — ONDANSETRON 2 MG/ML
4 INJECTION INTRAMUSCULAR; INTRAVENOUS EVERY 6 HOURS PRN
Status: CANCELLED | OUTPATIENT
Start: 2025-06-18

## 2025-06-18 RX ORDER — SODIUM CHLORIDE, SODIUM LACTATE, POTASSIUM CHLORIDE, CALCIUM CHLORIDE 600; 310; 30; 20 MG/100ML; MG/100ML; MG/100ML; MG/100ML
INJECTION, SOLUTION INTRAVENOUS CONTINUOUS
Status: DISCONTINUED | OUTPATIENT
Start: 2025-06-19 | End: 2025-06-19

## 2025-06-18 RX ORDER — ASPIRIN 81 MG/1
81 TABLET, CHEWABLE ORAL DAILY
Status: DISCONTINUED | OUTPATIENT
Start: 2025-06-19 | End: 2025-06-20 | Stop reason: HOSPADM

## 2025-06-18 RX ORDER — SODIUM CHLORIDE 0.9 % (FLUSH) 0.9 %
5-40 SYRINGE (ML) INJECTION EVERY 12 HOURS SCHEDULED
Status: CANCELLED | OUTPATIENT
Start: 2025-06-18

## 2025-06-18 RX ORDER — SODIUM CHLORIDE 0.9 % (FLUSH) 0.9 %
5-40 SYRINGE (ML) INJECTION PRN
Status: CANCELLED | OUTPATIENT
Start: 2025-06-18

## 2025-06-18 RX ORDER — ATORVASTATIN CALCIUM 40 MG/1
80 TABLET, FILM COATED ORAL DAILY
Status: DISCONTINUED | OUTPATIENT
Start: 2025-06-19 | End: 2025-06-20 | Stop reason: HOSPADM

## 2025-06-18 RX ORDER — ATORVASTATIN CALCIUM 40 MG/1
80 TABLET, FILM COATED ORAL NIGHTLY
Status: CANCELLED | OUTPATIENT
Start: 2025-06-18

## 2025-06-18 RX ORDER — SODIUM CHLORIDE 0.9 % (FLUSH) 0.9 %
5-40 SYRINGE (ML) INJECTION EVERY 12 HOURS SCHEDULED
Status: DISCONTINUED | OUTPATIENT
Start: 2025-06-18 | End: 2025-06-18 | Stop reason: HOSPADM

## 2025-06-18 RX ORDER — ONDANSETRON 4 MG/1
4 TABLET, ORALLY DISINTEGRATING ORAL EVERY 8 HOURS PRN
Status: CANCELLED | OUTPATIENT
Start: 2025-06-18

## 2025-06-18 RX ORDER — ATORVASTATIN CALCIUM 40 MG/1
80 TABLET, FILM COATED ORAL NIGHTLY
Status: DISCONTINUED | OUTPATIENT
Start: 2025-06-18 | End: 2025-06-18 | Stop reason: HOSPADM

## 2025-06-18 RX ORDER — ASPIRIN 81 MG/1
81 TABLET, CHEWABLE ORAL DAILY
Status: CANCELLED | OUTPATIENT
Start: 2025-06-19

## 2025-06-18 RX ADMIN — GADOBENATE DIMEGLUMINE 20 ML: 529 INJECTION, SOLUTION INTRAVENOUS at 12:24

## 2025-06-18 RX ADMIN — ENOXAPARIN SODIUM 40 MG: 100 INJECTION SUBCUTANEOUS at 08:40

## 2025-06-18 RX ADMIN — ATORVASTATIN CALCIUM 80 MG: 20 TABLET, FILM COATED ORAL at 01:15

## 2025-06-18 RX ADMIN — SODIUM CHLORIDE, PRESERVATIVE FREE 10 ML: 5 INJECTION INTRAVENOUS at 08:40

## 2025-06-18 RX ADMIN — ASPIRIN 81 MG: 81 TABLET, CHEWABLE ORAL at 08:40

## 2025-06-18 ASSESSMENT — PAIN SCALES - GENERAL
PAINLEVEL_OUTOF10: 0

## 2025-06-18 ASSESSMENT — ENCOUNTER SYMPTOMS
BACK PAIN: 0
SHORTNESS OF BREATH: 0
NAUSEA: 0
VOMITING: 0
SORE THROAT: 0
DIARRHEA: 0
ABDOMINAL PAIN: 0

## 2025-06-18 NOTE — H&P
Hospitalist Admission Note    NAME:   Otis Andrade   : 1975   MRN: [id number]     Date/Time: 2025 6:56 AM    Patient PCP: None, None    ______________________________________________________________________  Given the patient's current clinical presentation, I have a high level of concern for decompensation if discharged from the emergency department.  Complex decision making was performed, which includes reviewing the patient's available past medical records, laboratory results, and x-ray films.       My assessment of this patient's clinical condition and my plan of care is as follows.    Assessment / Plan:    49 y.o.  male with no significant past medical history presents to the emergency department after episode of blurry vision and feeling jittery earlier today at work.  Symptoms lasted for 15 to 20 minutes and have since resolved.  Workup in the emergency department was significant for a right frontal lobe age-indeterminate infarct.  He will be placed on the hospital service with plans for MRI with and without IV contrast and MRA head and neck along with typical CVA workup.    Right frontal lobe infarct: Seen on head CT.  Age-indeterminate but was not on head CT in 2024.  Seen by teleneurology who recommends CVA workup including MRI with and without IV contrast and MRA head and neck.  - MRI with and without IV contrast  - MRA head and neck  - Echo  - Check hemoglobin A1c and lipid panel  - Telemetry  - Neurology consultation    This patient was discussed with the emergency department physician    Otis Andrade, was evaluated through a synchronous (real-time) audio-video encounter. The patient (and/or guardian if applicable) is aware that this is a billable service, which includes applicable co-pays. This virtual visit was conducted with patient's (and/or legal guardian's) consent. Patient identification was verified, and a caregiver was present when appropriate.  The patient was located

## 2025-06-18 NOTE — CONSULTS
[facility]  Neurology Consult Note      Date:  25     Name:  OTIS ANDRADE  :  1975  MRN:  [id number]     PCP:  None, None    REQUESTING PHYSICIAN:     Otis Andrade is a 49 y.o. male who was seen for Blurred Vision and Anxiety      Assessment & Plan:   Cerebrovascular accident (I63.9)    Patient with no significant past medical history who had a visual disturbance while at work yesterday.  He was working on his computer he described a visual disturbance and then he just felt off little bit jittery symptoms lasted for 15 or 20 minutes and then resolved.  MRI of the brain positive for subacute infarct versus encephalomalacia along the anterior right MCA territory.    -MRI of the brain late subacute infarct versus developing encephalomalacia along the anterior right MCA territory.  -MRA of neck demonstrate no large vessel occlusion or significant luminal narrowing.  -MRA head: No obvious large vessel occlusion or significant luminal narrowing allowing for motion artifact.  -2D echo the heart is been obtained pending interpretation and dictation.  -Labs: Total cholesterol 195, .4 hemoglobin A1c 5.2    At the time of my evaluation Mr. Andrade is resting company in bed.  He is at his neurological baseline.    Patient stroke looks cardioembolic in nature due to size and location with MRI brain without small vessel ischemic disease and clean vasculature.  His stroke also does not appear to be acute.  Suspect it is greater than 48 hours old.    Recommend dual antiplatelet therapy with aspirin 81 mg, Plavix 75 mg for 21 days and then monotherapy with aspirin  Lipitor 80 mg nightly for secondary stroke prevention goal LDL of 70  Await for results of the 2D echo of the heart  Pending results of 2 days of the heart patient may require an outpatient PANKAJ  Patient needs to be discharged on 14-day cardiac event monitor.  I anticipate he will need a loop recorder  I recommend hypercoagulable panel

## 2025-06-18 NOTE — DISCHARGE SUMMARY
Discharge Summary    Name: Otis Andrade  [id number]  YOB: 1975 (Age: 49 y.o.)   Date of Admission: 6/17/2025  Date of Discharge: 6/18/2025  Attending Physician: Ben Damon MD    Discharge Diagnosis:     Consultations:  IP CONSULT TO TELE-NEUROLOGY  IP CONSULT TO CASE MANAGEMENT  IP CONSULT TO NEUROLOGY  IP CONSULT TO NEUROLOGY      Brief Admission History/Reason for Admission Per Ben Damon MD:       49 y.o.  male with no significant past medical history presents to the emergency department after episode of blurry vision and feeling jittery earlier today at work.  He reports that around 6 PM he felt anxious that was associated with blurry vision/double vision.  This lasted about 15 to 20 minutes.  He reports that this happened about a year ago and came to the emergency department where a head CT was done and was normal.  He states otherwise he has no other medical conditions and does not take any medications every day at home.  He otherwise denies any fever/chills, chest pain, palpitations, cough, shortness of breath, abdominal pain, nausea, vomiting or lower extremity swelling.  He has no other complaints at this time.     In the emergency department, his blood pressure was found to be elevated with otherwise stable vital signs within normal limits.  His CBC and CMP were unremarkable.  Initial troponin was 79 with repeat of 73.  A head CT showed an age-indeterminate right frontal lobe infarct.  Of note, this was not seen on the head CT done a year ago.  Neurology was consulted and recommended placement on the hospital service with plans for MRI with and without IV contrast as well as MRA head and neck.  He will be placed on the hospitalist service to the telemetry floor.      6/18  Patient is alert and oriented x 4 and evaluated with nurse at bedside along with wife present as well.  Patient's visual disturbance along with jitteriness has improved

## 2025-06-18 NOTE — ACP (ADVANCE CARE PLANNING)
Patient does not have ACP documents. When offered information on ACP documents, patient did not want any information on this.

## 2025-06-18 NOTE — PLAN OF CARE
Problem: Physical Therapy - Adult  Goal: By Discharge: Performs mobility at highest level of function for planned discharge setting.  See evaluation for individualized goals.  Description: FUNCTIONAL STATUS PRIOR TO ADMISSION: Patient was independent and active without use of DME.    HOME SUPPORT PRIOR TO ADMISSION: The patient lived with girl friend but did not require assistance.    Physical Therapy Goals  Initiated 6/18/2025    Outcome: Progressing  PHYSICAL THERAPY EVALUATION/DISCHARGE    Patient: Otis Andrade (49 y.o. male)  Date: 6/18/2025  Primary Diagnosis: Elevated blood pressure reading [R03.0]  Visual disturbance [H53.9]  Abnormal CT of the head [R93.0]  Cerebrovascular accident (CVA), unspecified mechanism (HCC) [I63.9]       Precautions:              ASSESSMENT AND RECOMMENDATIONS:  Based on the objective data below, the patient was seen for PT evaluation today after visual disturbance and possible CVA, he was laying in the bed, he was ind with all bed mobility, he was able to transfers ind from sit to stand, ambulate with no AD approx 50 ft with good balance. Strength is WNL suman, coordination is WNL suman, balance is good with 30 sec SLS, NBOS, and tandem standing. NBOS eyes open and eyes closed 30 sec. It is felt that patient is at his ind baseline and does not need PT services at this time.         Further skilled acute physical therapy is not indicated at this time.       PLAN :  Recommendation for discharge: (in order for the patient to meet his/her long term goals):   No skilled physical therapy    Other factors to consider for discharge: ind and has good support system at home    IF patient discharges home will need the following DME: none       SUBJECTIVE:   Patient stated “I feel good now.”    OBJECTIVE DATA SUMMARY:   History reviewed. No pertinent past medical history.History reviewed. No pertinent surgical history.    Home Situation and Prior Level of Function: ind working full time for

## 2025-06-18 NOTE — CARE COORDINATION
Care Management Initial Assessment       RUR: N/A OBS  Readmission? No  1st IM letter given? No  1st  letter given: No  VOOM given 25 1204   Service Assessment   Patient Orientation Alert and Oriented;Person;Place;Situation;Self   Cognition Alert   History Provided By Patient   Primary Caregiver Self   Support Systems Spouse/Significant Other   Patient's Healthcare Decision Maker is: Patient Declined (Legal Next of Kin Remains as Decision Maker)   PCP Verified by CM No   Prior Functional Level Independent in ADLs/IADLs   Current Functional Level Independent in ADLs/IADLs   Can patient return to prior living arrangement Yes   Ability to make needs known: Good   Family able to assist with home care needs: Yes   Would you like for me to discuss the discharge plan with any other family members/significant others, and if so, who? No   Social/Functional History   Lives With Significant other   Type of Home Mobile home   Home Layout One level   Receives Help From Friend(s)   Prior Level of Assist for ADLs Independent   Prior Level of Assist for Homemaking Independent   Homemaking Responsibilities Yes   Ambulation Assistance Independent   Prior Level of Assist for Transfers Independent   Active  Yes   Mode of Transportation Car       Patient confirmed using 2 patient identifiers (name and ) and the following demographics:    Address: [address] [place] [address]    Phone: [phone]    Pharmacy: [facility] [place] [phone]    Patient was able to confirm his emergency contact, his girlfriend Ladan Carcamo at [phone]. He does not have a PCP and cannot remember the last time he was at the doctors. He lives in a mobile home with 3 steps. He is independent with ADLs and home chores. He is an active . He does not use DME or home services. He is not interested in ACP documents. Will continue to monitor patient with case management until the time of discharge.

## 2025-06-18 NOTE — PROGRESS NOTES
Speech Pathology Note  Orders received and appreciated. Note SLP consulted due to stroke/TIA. SLP to follow for formal evaluation pending discussion with medical team and results of further testing. Thank you.    Addendum: Discussed case with RN who reported no SLP-related concerns. SLP will follow as indicated pending MRI results. Thank you.    Jessica Billingsley, SLP  Contact via [employer]

## 2025-06-18 NOTE — PROGRESS NOTES
Spiritual Health History and Assessment/Progress Note  [facility]    Attempted Encounter (The patient was being tested. visited with patient's signiificant other.),  ,  ,      Name: Otis Andrade MRN: [id number]    Age: 49 y.o.     Sex: male   Language: English   Anabaptist: Taoist   Visual disturbance     Date: 6/18/2025            Total Time Calculated: 19 min              Spiritual Assessment began in [facility] MED/SURG        Referral/Consult From: Rounding   Encounter Overview/Reason: Attempted Encounter (The patient was being tested. visited with patient's signiificant other.)  Service Provided For: Significant other    Janelle, Belief, Meaning:   Patient identifies as spiritual, is connected with a janelle tradition or spiritual practice, and has beliefs or practices that help with coping during difficult times  Family/Friends identify as spiritual, are connected with a janelle tradition or spiritual practice, and have beliefs or practices that help with coping during difficult times      Importance and Influence:  Patient has spiritual/personal beliefs that influence decisions regarding their health  Family/Friends have spiritual/personal beliefs that influence decisions regarding the patient's health    Community:  Patient is connected with a spiritual community and feels well-supported. Support system includes: Spouse/Partner, Children, and Janelle Community  Family/Friends are connected with a spiritual community: and feel well-supported. Support system includes: Spouse/Partner, Children, and Extended family      Assessment and Plan of Care:     Patient Interventions include: Facilitated expression of thoughts and feelings, Affirmed coping skills/support systems, and Provided sacramental/Christian ritual  Family/Friends Interventions include:   Facilitated expression of thoughts and feelings, Affirmed coping skills/support systems, and Provided sacramental/Christian ritual  Patient Plan of Care:

## 2025-06-18 NOTE — PROGRESS NOTES
TRANSFER - OUT REPORT:    Verbal report given to Corinne HUANG @ [facility] Neuro unit and Kyler from [facility] on Otis Andrade being transferred to [facility] for higher level of care.    Report consisted of patient's Situation, Background, Assessment and   Recommendations(SBAR).     Information from the following report(s) Nurse Handoff Report, Adult Overview, Recent Results, Neuro Assessment, and Event Log was reviewed with the receiving nurse.           Lines:   Peripheral IV 06/17/25 Right;Anterior Cephalic (Active)   Site Assessment Clean, dry & intact 06/18/25 0800   Line Status Capped;Flushed;Normal saline locked 06/18/25 0800   Line Care Cap changed;Connections checked and tightened;Ports disinfected 06/18/25 0800   Phlebitis Assessment No symptoms 06/18/25 0800   Infiltration Assessment 0 06/18/25 0800   Alcohol Cap Used Yes 06/18/25 0800   Dressing Status Clean;Dry;Intact 06/18/25 0800   Dressing Type Transparent 06/18/25 0800   Dressing Intervention New 06/17/25 2122        Opportunity for questions and clarification was provided.      Patient transported with:  Monitor and IV

## 2025-06-18 NOTE — ED NOTES
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 Denies blurred vision/anxiety at this time. Reports that he still feels jittery. Initial FSBS: 112 on triage, repeat 107. Denies CP or SOB. Troponins:  79, 73. NIHSS: 0. CT of Head notable for age-indeterminant Right Frontal Lobe Infarct. EKG: SB. Pt hypertensive during ED visit. Pt received Aspirin 324 mg chewable in ED.  Patient is being transferred to [facility] ([facility] Room# pending    Patient's Chief Complaint was Blurred Vision, Jittery, Anxious and is admitted for Visual Disturbance, Abnormal Head CT, Elevated Blood Pressure Reading    CODE STATUS: Full  CSSRS: 0 - No Risk    ISOLATION/PRECAUTIONS: No  ISOLATION TYPE: n/a    Is this a behavioral health patient? No  Has wanding been completed n/a  Are belongings secure? N/a    Called outstanding consults: Yes    STAT labs collected: Yes    Repeat Lactic Acid DUE? No  TIME DUE: n/a    All STAT orders are complete: Yes    The following personal items will be sent with the patient during transfer to the floor:     All valuables: listed in ER with patient, with patient at bedside       ASSESSMENT:    NEURO:   NIH SCORE: 0,1-4,5-15,15-20,21-42: 0   ANNETTE SWALLOW SCREEN COMPLETE: Yes  ORIENTATION LEVEL: ORIENTATION LEVEL: Person, Place, Time, and Situation  Cognition:  appropriate decision making, appropriate for age attention/concentration, appropriate safety awareness, decreased attention/concentration, and following commands  follows multi-step complex commands/direction  Speech: shows no evidence of impairment    Is patient impulsive? No  Is patient oriented? Yes  Do they follow commands? Yes  Is the patient ambulatory? No    FALL RISK? No  Interventions: n/a    RESPIRATORY:   Is patient on oxygen?

## 2025-06-18 NOTE — PROGRESS NOTES
[facility] called, spoke with James, notified this nurse of bed   @ [facility] 1 Neuro unit, Bed 126.   Report number #[phone]    Charge nurse aware. Supervisor aware    @1947 Attempt to call report at number. No answer, wasn't able to leave message, phone kept ringing for multiple minutes    @2000 Called again, nurse answered (Corinne HUANG). Report given as documented

## 2025-06-18 NOTE — ED PROVIDER NOTES
the past 24 hours)   POCT Glucose    Collection Time: 06/17/25  8:00 PM   Result Value Ref Range    POC Glucose 112 65 - 117 mg/dL    Performed by: Rehan Jacobo    EKG 12 Lead    Collection Time: 06/17/25  9:17 PM   Result Value Ref Range    Ventricular Rate 59 BPM    Atrial Rate 59 BPM    P-R Interval 132 ms    QRS Duration 92 ms    Q-T Interval 398 ms    QTc Calculation (Bazett) 394 ms    P Axis 3 degrees    R Axis 53 degrees    T Axis 46 degrees    Diagnosis       Sinus bradycardia  Otherwise normal ECG  When compared with ECG of 11-APR-2024 10:14,  No significant change was found     CBC with Auto Differential    Collection Time: 06/17/25  9:22 PM   Result Value Ref Range    WBC 9.6 4.1 - 11.1 K/uL    RBC 5.33 4.10 - 5.70 M/uL    Hemoglobin 14.1 12.1 - 17.0 g/dL    Hematocrit 42.4 36.6 - 50.3 %    MCV 79.5 (L) 80.0 - 99.0 FL    MCH 26.5 26.0 - 34.0 PG    MCHC 33.3 30.0 - 36.5 g/dL    RDW 14.2 11.5 - 14.5 %    Platelets 238 150 - 400 K/uL    MPV 10.2 8.9 - 12.9 FL    Nucleated RBCs 0.0 0  WBC    nRBC 0.00 0.00 - 0.01 K/uL    Neutrophils % 61.3 32.0 - 75.0 %    Lymphocytes % 27.6 12.0 - 49.0 %    Monocytes % 8.8 5.0 - 13.0 %    Eosinophils % 1.6 0.0 - 7.0 %    Basophils % 0.4 0.0 - 1.0 %    Immature Granulocytes % 0.3 0.0 - 0.5 %    Neutrophils Absolute 5.91 1.80 - 8.00 K/UL    Lymphocytes Absolute 2.66 0.80 - 3.50 K/UL    Monocytes Absolute 0.85 0.00 - 1.00 K/UL    Eosinophils Absolute 0.15 0.00 - 0.40 K/UL    Basophils Absolute 0.04 0.00 - 0.10 K/UL    Immature Granulocytes Absolute 0.03 0.00 - 0.04 K/UL    Differential Type AUTOMATED     Comprehensive Metabolic Panel    Collection Time: 06/17/25  9:22 PM   Result Value Ref Range    Sodium 139 136 - 145 mmol/L    Potassium 4.2 3.5 - 5.1 mmol/L    Chloride 101 97 - 108 mmol/L    CO2 28 21 - 32 mmol/L    Anion Gap 10 2 - 12 mmol/L    Glucose 107 (H) 65 - 100 mg/dL    BUN 15 6 - 20 MG/DL    Creatinine 1.04 0.70 - 1.30 MG/DL    BUN/Creatinine

## 2025-06-19 ENCOUNTER — APPOINTMENT (OUTPATIENT)
Facility: HOSPITAL | Age: 50
DRG: 064 | End: 2025-06-19
Attending: STUDENT IN AN ORGANIZED HEALTH CARE EDUCATION/TRAINING PROGRAM
Payer: COMMERCIAL

## 2025-06-19 LAB
ALBUMIN SERPL-MCNC: 3.5 G/DL (ref 3.5–5)
ALBUMIN/GLOB SERPL: 0.9 (ref 1.1–2.2)
ALP SERPL-CCNC: 61 U/L (ref 45–117)
ALT SERPL-CCNC: 26 U/L (ref 12–78)
ANION GAP SERPL CALC-SCNC: 6 MMOL/L (ref 2–12)
APTT PPP: 27.2 SEC (ref 22.1–31)
AST SERPL-CCNC: 12 U/L (ref 15–37)
BASOPHILS # BLD: 0.05 K/UL (ref 0–0.1)
BASOPHILS NFR BLD: 0.7 % (ref 0–1)
BILIRUB SERPL-MCNC: 0.9 MG/DL (ref 0.2–1)
BUN SERPL-MCNC: 10 MG/DL (ref 6–20)
BUN/CREAT SERPL: 10 (ref 12–20)
CALCIUM SERPL-MCNC: 9.1 MG/DL (ref 8.5–10.1)
CHLORIDE SERPL-SCNC: 107 MMOL/L (ref 97–108)
CO2 SERPL-SCNC: 25 MMOL/L (ref 21–32)
CREAT SERPL-MCNC: 1.02 MG/DL (ref 0.7–1.3)
CRP SERPL-MCNC: <0.29 MG/DL (ref 0–0.3)
DIFFERENTIAL METHOD BLD: NORMAL
ECHO BSA: 2.22 M2
ECHO LV EF PHYSICIAN: 55 %
EKG ATRIAL RATE: 59 BPM
EKG DIAGNOSIS: NORMAL
EKG P AXIS: 3 DEGREES
EKG P-R INTERVAL: 132 MS
EKG Q-T INTERVAL: 398 MS
EKG QRS DURATION: 92 MS
EKG QTC CALCULATION (BAZETT): 394 MS
EKG R AXIS: 53 DEGREES
EKG T AXIS: 46 DEGREES
EKG VENTRICULAR RATE: 59 BPM
EOSINOPHIL # BLD: 0.12 K/UL (ref 0–0.4)
EOSINOPHIL NFR BLD: 1.6 % (ref 0–7)
ERYTHROCYTE [DISTWIDTH] IN BLOOD BY AUTOMATED COUNT: 14.3 % (ref 11.5–14.5)
FERRITIN SERPL-MCNC: 308 NG/ML (ref 26–388)
FOLATE SERPL-MCNC: 28.1 NG/ML (ref 5–21)
GLOBULIN SER CALC-MCNC: 4 G/DL (ref 2–4)
GLUCOSE SERPL-MCNC: 100 MG/DL (ref 65–100)
HCT VFR BLD AUTO: 45 % (ref 36.6–50.3)
HGB BLD-MCNC: 14.6 G/DL (ref 12.1–17)
IMM GRANULOCYTES # BLD AUTO: 0.02 K/UL (ref 0–0.04)
IMM GRANULOCYTES NFR BLD AUTO: 0.3 % (ref 0–0.5)
INR PPP: 1.1 (ref 0.9–1.1)
IRON SATN MFR SERPL: 33 % (ref 20–50)
IRON SERPL-MCNC: 89 UG/DL (ref 35–150)
LYMPHOCYTES # BLD: 3.08 K/UL (ref 0.8–3.5)
LYMPHOCYTES NFR BLD: 40.1 % (ref 12–49)
MAGNESIUM SERPL-MCNC: 2.1 MG/DL (ref 1.6–2.4)
MCH RBC QN AUTO: 26.4 PG (ref 26–34)
MCHC RBC AUTO-ENTMCNC: 32.4 G/DL (ref 30–36.5)
MCV RBC AUTO: 81.4 FL (ref 80–99)
MONOCYTES # BLD: 0.81 K/UL (ref 0–1)
MONOCYTES NFR BLD: 10.5 % (ref 5–13)
NEUTS SEG # BLD: 3.6 K/UL (ref 1.8–8)
NEUTS SEG NFR BLD: 46.8 % (ref 32–75)
NRBC # BLD: 0 K/UL (ref 0–0.01)
NRBC BLD-RTO: 0 PER 100 WBC
NT PRO BNP: 42 PG/ML
PHOSPHATE SERPL-MCNC: 4.7 MG/DL (ref 2.6–4.7)
PLATELET # BLD AUTO: 236 K/UL (ref 150–400)
PMV BLD AUTO: 10.6 FL (ref 8.9–12.9)
POTASSIUM SERPL-SCNC: 4 MMOL/L (ref 3.5–5.1)
PROT SERPL-MCNC: 7.5 G/DL (ref 6.4–8.2)
PROTHROMBIN TIME: 11.8 SEC (ref 9.2–11.2)
RBC # BLD AUTO: 5.53 M/UL (ref 4.1–5.7)
SODIUM SERPL-SCNC: 138 MMOL/L (ref 136–145)
THERAPEUTIC RANGE: NORMAL SECS (ref 58–77)
TIBC SERPL-MCNC: 272 UG/DL (ref 250–450)
VIT B12 SERPL-MCNC: 348 PG/ML (ref 193–986)
WBC # BLD AUTO: 7.7 K/UL (ref 4.1–11.1)

## 2025-06-19 PROCEDURE — 1100000003 HC PRIVATE W/ TELEMETRY

## 2025-06-19 PROCEDURE — B24BZZ4 ULTRASONOGRAPHY OF HEART WITH AORTA, TRANSESOPHAGEAL: ICD-10-PCS | Performed by: STUDENT IN AN ORGANIZED HEALTH CARE EDUCATION/TRAINING PROGRAM

## 2025-06-19 PROCEDURE — 83880 ASSAY OF NATRIURETIC PEPTIDE: CPT

## 2025-06-19 PROCEDURE — 83735 ASSAY OF MAGNESIUM: CPT

## 2025-06-19 PROCEDURE — 82728 ASSAY OF FERRITIN: CPT

## 2025-06-19 PROCEDURE — 2500000003 HC RX 250 WO HCPCS: Performed by: STUDENT IN AN ORGANIZED HEALTH CARE EDUCATION/TRAINING PROGRAM

## 2025-06-19 PROCEDURE — 83540 ASSAY OF IRON: CPT

## 2025-06-19 PROCEDURE — 82607 VITAMIN B-12: CPT

## 2025-06-19 PROCEDURE — 80053 COMPREHEN METABOLIC PANEL: CPT

## 2025-06-19 PROCEDURE — 2580000003 HC RX 258: Performed by: STUDENT IN AN ORGANIZED HEALTH CARE EDUCATION/TRAINING PROGRAM

## 2025-06-19 PROCEDURE — 6370000000 HC RX 637 (ALT 250 FOR IP): Performed by: INTERNAL MEDICINE

## 2025-06-19 PROCEDURE — 94760 N-INVAS EAR/PLS OXIMETRY 1: CPT

## 2025-06-19 PROCEDURE — 36415 COLL VENOUS BLD VENIPUNCTURE: CPT

## 2025-06-19 PROCEDURE — 84443 ASSAY THYROID STIM HORMONE: CPT

## 2025-06-19 PROCEDURE — 82746 ASSAY OF FOLIC ACID SERUM: CPT

## 2025-06-19 PROCEDURE — 99152 MOD SED SAME PHYS/QHP 5/>YRS: CPT

## 2025-06-19 PROCEDURE — 6360000002 HC RX W HCPCS: Performed by: INTERNAL MEDICINE

## 2025-06-19 PROCEDURE — 93320 DOPPLER ECHO COMPLETE: CPT

## 2025-06-19 PROCEDURE — 85610 PROTHROMBIN TIME: CPT

## 2025-06-19 PROCEDURE — 85025 COMPLETE CBC W/AUTO DIFF WBC: CPT

## 2025-06-19 PROCEDURE — 86140 C-REACTIVE PROTEIN: CPT

## 2025-06-19 PROCEDURE — 83550 IRON BINDING TEST: CPT

## 2025-06-19 PROCEDURE — 85730 THROMBOPLASTIN TIME PARTIAL: CPT

## 2025-06-19 PROCEDURE — 84100 ASSAY OF PHOSPHORUS: CPT

## 2025-06-19 RX ORDER — FENTANYL CITRATE 50 UG/ML
INJECTION, SOLUTION INTRAMUSCULAR; INTRAVENOUS PRN
Status: COMPLETED | OUTPATIENT
Start: 2025-06-19 | End: 2025-06-19

## 2025-06-19 RX ORDER — LIDOCAINE HYDROCHLORIDE 20 MG/ML
SOLUTION OROPHARYNGEAL PRN
Status: COMPLETED | OUTPATIENT
Start: 2025-06-19 | End: 2025-06-19

## 2025-06-19 RX ORDER — MIDAZOLAM HYDROCHLORIDE 1 MG/ML
INJECTION, SOLUTION INTRAMUSCULAR; INTRAVENOUS PRN
Status: COMPLETED | OUTPATIENT
Start: 2025-06-19 | End: 2025-06-19

## 2025-06-19 RX ADMIN — FENTANYL CITRATE 25 MCG: 50 INJECTION, SOLUTION INTRAMUSCULAR; INTRAVENOUS at 13:04

## 2025-06-19 RX ADMIN — FENTANYL CITRATE 25 MCG: 50 INJECTION, SOLUTION INTRAMUSCULAR; INTRAVENOUS at 13:02

## 2025-06-19 RX ADMIN — LIDOCAINE HYDROCHLORIDE 10 ML: 20 SOLUTION ORAL at 12:58

## 2025-06-19 RX ADMIN — SODIUM CHLORIDE, SODIUM LACTATE, POTASSIUM CHLORIDE, AND CALCIUM CHLORIDE: .6; .31; .03; .02 INJECTION, SOLUTION INTRAVENOUS at 02:28

## 2025-06-19 RX ADMIN — MIDAZOLAM HYDROCHLORIDE 1 MG: 1 INJECTION, SOLUTION INTRAMUSCULAR; INTRAVENOUS at 13:08

## 2025-06-19 RX ADMIN — SODIUM CHLORIDE, PRESERVATIVE FREE 10 ML: 5 INJECTION INTRAVENOUS at 09:01

## 2025-06-19 RX ADMIN — FENTANYL CITRATE 25 MCG: 50 INJECTION, SOLUTION INTRAMUSCULAR; INTRAVENOUS at 13:08

## 2025-06-19 RX ADMIN — BENZOCAINE, BUTAMBEN, AND TETRACAINE HYDROCHLORIDE 1 SPRAY: .028; .004; .004 AEROSOL, SPRAY TOPICAL at 12:58

## 2025-06-19 RX ADMIN — MIDAZOLAM HYDROCHLORIDE 1 MG: 1 INJECTION, SOLUTION INTRAMUSCULAR; INTRAVENOUS at 13:04

## 2025-06-19 RX ADMIN — MIDAZOLAM HYDROCHLORIDE 1 MG: 1 INJECTION, SOLUTION INTRAMUSCULAR; INTRAVENOUS at 13:02

## 2025-06-19 RX ADMIN — MIDAZOLAM HYDROCHLORIDE 2 MG: 1 INJECTION, SOLUTION INTRAMUSCULAR; INTRAVENOUS at 13:10

## 2025-06-19 RX ADMIN — SODIUM CHLORIDE, PRESERVATIVE FREE 10 ML: 5 INJECTION INTRAVENOUS at 21:03

## 2025-06-19 ASSESSMENT — PAIN SCALES - GENERAL: PAINLEVEL_OUTOF10: 0

## 2025-06-19 NOTE — H&P
medications reviewed extensively and updated within the patient's most current medication list to the best of my ability at time of admission. Will tentatively continue all other home medications for chronic conditions-- without any acute contraindications (from list updated below.)     *VTE prophylaxis ordered, unless otherwise contraindicated. Additional details available via EMR.     *As is my typical documentation style, an integrative HPI has been provided above the impression & plan. The active problem list has been updated to reflect the problems being addressed, though this may all be integrated under the assessment and plan. For every admission, I take the time to update the PMH, active problem list, and medication list to the best of my ability. For the physical exam, I address the most pertinent systems findings: unless otherwise stated, other systems were either normal or did not require extensive evaluation/documentation. This document has been produced using \"Dragon\" computerized transcription. Therefore, although this document has been proofread to the best of my ability, errors/typos may persist.     Care plan and relevant information     CODE STATUS   Full Code     Baseline functional status Previously independent   Discharge Planning:  *based on limited data Anticipated LOS: ~ 2-3 days  Anticipated disposition: Home, but with this and length of stay limited with workup still pending   Surrogate decision maker:  Extended Emergency Contact Information  Primary Emergency Contact: RANJITH EUCEDA  Home Phone: [phone]  Mobile Phone: [phone]  Relation: Girlfriend   Miscellaneous   Inpatient  Payor: ELAINA OCONNELL / Plan: [place] / Product Type: *No Product type* /   No active isolations No active infections        Subjective    Past Medical History:   Diagnosis Date    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

## 2025-06-19 NOTE — PLAN OF CARE
Problem: Chronic Conditions and Co-morbidities  Goal: Patient's chronic conditions and co-morbidity symptoms are monitored and maintained or improved  6/19/2025 0634 by Corinne Atkins RN  Outcome: Progressing  6/18/2025 2255 by Corinne Atkins RN  Outcome: Progressing     Problem: Discharge Planning  Goal: Discharge to home or other facility with appropriate resources  6/19/2025 0634 by Corinne Atkins RN  Outcome: Progressing  6/18/2025 2255 by Corinne Atkins RN  Outcome: Progressing     Problem: ABCDS Injury Assessment  Goal: Absence of physical injury  6/19/2025 0634 by Corinne Atkins RN  Outcome: Progressing  6/18/2025 2255 by Corinne Atkins RN  Outcome: Progressing     Problem: Safety - Adult  Goal: Free from fall injury  6/19/2025 0634 by Corinne Atkins RN  Outcome: Progressing  6/18/2025 2255 by Corinne Atkins RN  Outcome: Progressing     Problem: Neurosensory - Adult  Goal: Achieves stable or improved neurological status  6/19/2025 0634 by Corinne Atkins RN  Outcome: Progressing  6/18/2025 2255 by Corinne Atkins RN  Outcome: Progressing  Goal: Achieves maximal functionality and self care  6/19/2025 0634 by Corinne Atkins RN  Outcome: Progressing  6/18/2025 2255 by Corinne Atkins RN  Outcome: Progressing     Problem: Cardiovascular - Adult  Goal: Maintains optimal cardiac output and hemodynamic stability  6/19/2025 0634 by Corinne Atkins RN  Outcome: Progressing  6/18/2025 2255 by Corinne Atkins RN  Outcome: Progressing  Goal: Absence of cardiac dysrhythmias or at baseline  6/19/2025 0634 by Corinne Atkins RN  Outcome: Progressing  6/18/2025 2255 by Corinne Atkins RN  Outcome: Progressing     Problem: Skin/Tissue Integrity - Adult  Goal: Skin integrity remains intact  6/19/2025 0634 by Corinne Atkins RN  Outcome: Progressing  6/18/2025 2255 by Corinne Atkins RN  Outcome: Progressing  Goal: Oral mucous membranes remain intact  6/19/2025 0634 by Corinne Atkins RN  Outcome: Progressing  6/18/2025 2255 by Corinne Atkins RN  Outcome: Progressing

## 2025-06-19 NOTE — CARE COORDINATION
Care Management Initial Assessment       RUR:  8  Readmission? Yes - transferred from [facility]  1st IM letter given? No  1st  letter given: No         06/19/25 0925   Readmission Assessment   Number of Days since last admission? 1-7 days   Previous Disposition Other (comment)  (transfer from [facility])   Who is being Interviewed Patient   What was the patient's/caregiver's perception as to why they think they needed to return back to the hospital? Other (Comment)  ([facility] wanted to do an ECHO here.)   Did you visit your Primary Care Physician after you left the hospital, before you returned this time? No   Why weren't you able to visit your PCP? Other (Comment)  (transfer)   Did you see a specialist, such as Cardiac, Pulmonary, Orthopedic Physician, etc. after you left the hospital? No   Who advised the patient to return to the hospital? Physician   Does the patient report anything that got in the way of taking their medications? No   In our efforts to provide the best possible care to you and others like you, can you think of anything that we could have done to help you after you left the hospital the first time, so that you might not have needed to return so soon? Other (Comment)  ([facility] needed to transfer me here b/c my ultrasound showed issues.)         CM completed readmission assessment.  Pt transferred from [facility].  Pt lives in a mobile home with 3 steps to enter. Pt is independent with ADLs and IADLS. Pt is an active . Pt does not use DME.  Pt uses the [facility] in [place] for medications.     Advance Care Planning     General Advance Care Planning (ACP) Conversation    Date of Conversation: 6/19/2025  Conducted with: Patient with Decision Making Capacity  Other persons present: None    Healthcare Decision Maker: No healthcare decision makers have been documented.     Today we documented Decision Maker(s) consistent with Legal Next of Kin hierarchy.  Content/Action Overview:  DECLINED ACP Conversation -

## 2025-06-19 NOTE — PLAN OF CARE
Problem: Chronic Conditions and Co-morbidities  Goal: Patient's chronic conditions and co-morbidity symptoms are monitored and maintained or improved  Outcome: Progressing     Problem: Discharge Planning  Goal: Discharge to home or other facility with appropriate resources  Outcome: Progressing     Problem: ABCDS Injury Assessment  Goal: Absence of physical injury  Outcome: Progressing     Problem: Safety - Adult  Goal: Free from fall injury  Outcome: Progressing     Problem: Neurosensory - Adult  Goal: Achieves stable or improved neurological status  Outcome: Progressing  Goal: Achieves maximal functionality and self care  Outcome: Progressing     Problem: Cardiovascular - Adult  Goal: Maintains optimal cardiac output and hemodynamic stability  Outcome: Progressing  Goal: Absence of cardiac dysrhythmias or at baseline  Outcome: Progressing     Problem: Skin/Tissue Integrity - Adult  Goal: Skin integrity remains intact  Outcome: Progressing  Goal: Oral mucous membranes remain intact  Outcome: Progressing     Problem: Infection - Adult  Goal: Absence of infection at discharge  Outcome: Progressing     Problem: Metabolic/Fluid and Electrolytes - Adult  Goal: Electrolytes maintained within normal limits  Outcome: Progressing  Goal: Hemodynamic stability and optimal renal function maintained  Outcome: Progressing  Goal: Glucose maintained within prescribed range  Outcome: Progressing     Problem: Hematologic - Adult  Goal: Maintains hematologic stability  Outcome: Progressing

## 2025-06-19 NOTE — PROGRESS NOTES
[facility] contacted regarding ALS transport to [facility] [address], [facility] on the 1 neuro unit. [facility] . Arrangements for transport made by [facility]. Requested information provided. ETA: [phone].  staff aware.

## 2025-06-19 NOTE — CONSULTS
[facility]  [address] [place] [address]  [phone]  [email or web address].C[employer]ple[employer]       CARDIOLOGY CONSULTATION       Date of  Admission: 6/18/2025 10:21 PM     Admission type:Emergency   Primary Care Physician:None, None     Attending Provider: Frankie Greer MD  Cardiology Provider: Referred from [facility]/REASON FOR CONSULT: Mitral valve abnormality     Subjective:     Otis Andrade is a 49 y.o. 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  The patient was referred from [facility] for abnormality on the mitral valve.      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      Social History     Socioeconomic History    Marital status: Single     Spouse name: None    Number of children: None    Years of education: None    Highest education level: None   Tobacco Use    Smoking status: Never     Passive exposure: Never    Smokeless tobacco: Never   Vaping Use    Vaping status: Never Used   Substance and Sexual Activity    Alcohol use: Yes     Alcohol/week: 1.0 standard drink of alcohol     Types: 1 Shots of liquor per week    Drug use: Never    Sexual activity: Yes     Partners: Female     Social Drivers of Health     Food Insecurity: No Food Insecurity (6/18/2025)    Hunger Vital Sign     Worried About Running Out of Food in the Last Year: Never true     Ran Out of Food in the Last Year: Never true   Transportation Needs: No Transportation Needs (6/18/2025)    PRAPARE - Transportation     Lack of Transportation (Medical): No     Lack of Transportation (Non-Medical): No   Housing Stability: Low Risk

## 2025-06-19 NOTE — PROGRESS NOTES
Discharge Summary    Otis Andrade  :  1975  MRN:  [id number]    ADMIT DATE:  2025  DISCHARGE DATE:  2025      Discharge instruction reviewed with patient. Report given to Corinne HUANG @ [facility] Neuro unit, and Kyler from [facility]    Home med's returned No. No meds to return.    Personal belongings returned Yes    Patient Wheeled to front entrance via stretcher with [place].        SIGNED:    Jeny Camargo RN

## 2025-06-20 VITALS
HEIGHT: 73 IN | OXYGEN SATURATION: 100 % | BODY MASS INDEX: 28.2 KG/M2 | HEART RATE: 61 BPM | RESPIRATION RATE: 17 BRPM | WEIGHT: 212.74 LBS | TEMPERATURE: 98.2 F | SYSTOLIC BLOOD PRESSURE: 139 MMHG | DIASTOLIC BLOOD PRESSURE: 91 MMHG

## 2025-06-20 LAB
ALBUMIN SERPL-MCNC: 3.5 G/DL (ref 3.5–5)
ALBUMIN/GLOB SERPL: 0.9 (ref 1.1–2.2)
ALP SERPL-CCNC: 63 U/L (ref 45–117)
ALT SERPL-CCNC: 22 U/L (ref 12–78)
ANION GAP SERPL CALC-SCNC: 6 MMOL/L (ref 2–12)
AST SERPL-CCNC: 12 U/L (ref 15–37)
BASOPHILS # BLD: 0.05 K/UL (ref 0–0.1)
BASOPHILS NFR BLD: 0.6 % (ref 0–1)
BILIRUB SERPL-MCNC: 0.8 MG/DL (ref 0.2–1)
BUN SERPL-MCNC: 13 MG/DL (ref 6–20)
BUN/CREAT SERPL: 12 (ref 12–20)
CALCIUM SERPL-MCNC: 9.1 MG/DL (ref 8.5–10.1)
CHLORIDE SERPL-SCNC: 105 MMOL/L (ref 97–108)
CO2 SERPL-SCNC: 26 MMOL/L (ref 21–32)
CREAT SERPL-MCNC: 1.06 MG/DL (ref 0.7–1.3)
DIFFERENTIAL METHOD BLD: NORMAL
EOSINOPHIL # BLD: 0.1 K/UL (ref 0–0.4)
EOSINOPHIL NFR BLD: 1.2 % (ref 0–7)
ERYTHROCYTE [DISTWIDTH] IN BLOOD BY AUTOMATED COUNT: 14.3 % (ref 11.5–14.5)
GLOBULIN SER CALC-MCNC: 4.1 G/DL (ref 2–4)
GLUCOSE SERPL-MCNC: 94 MG/DL (ref 65–100)
HCT VFR BLD AUTO: 45.2 % (ref 36.6–50.3)
HGB BLD-MCNC: 14.5 G/DL (ref 12.1–17)
IMM GRANULOCYTES # BLD AUTO: 0.03 K/UL (ref 0–0.04)
IMM GRANULOCYTES NFR BLD AUTO: 0.4 % (ref 0–0.5)
LYMPHOCYTES # BLD: 2.69 K/UL (ref 0.8–3.5)
LYMPHOCYTES NFR BLD: 32.8 % (ref 12–49)
MAGNESIUM SERPL-MCNC: 2 MG/DL (ref 1.6–2.4)
MCH RBC QN AUTO: 26.1 PG (ref 26–34)
MCHC RBC AUTO-ENTMCNC: 32.1 G/DL (ref 30–36.5)
MCV RBC AUTO: 81.3 FL (ref 80–99)
MONOCYTES # BLD: 0.74 K/UL (ref 0–1)
MONOCYTES NFR BLD: 9 % (ref 5–13)
NEUTS SEG # BLD: 4.59 K/UL (ref 1.8–8)
NEUTS SEG NFR BLD: 56 % (ref 32–75)
NRBC # BLD: 0 K/UL (ref 0–0.01)
NRBC BLD-RTO: 0 PER 100 WBC
PHOSPHATE SERPL-MCNC: 4.3 MG/DL (ref 2.6–4.7)
PLATELET # BLD AUTO: 223 K/UL (ref 150–400)
PMV BLD AUTO: 10.1 FL (ref 8.9–12.9)
POTASSIUM SERPL-SCNC: 4.1 MMOL/L (ref 3.5–5.1)
PROT SERPL-MCNC: 7.6 G/DL (ref 6.4–8.2)
RBC # BLD AUTO: 5.56 M/UL (ref 4.1–5.7)
SODIUM SERPL-SCNC: 137 MMOL/L (ref 136–145)
TSH SERPL DL<=0.05 MIU/L-ACNC: 1.15 UIU/ML (ref 0.45–4.5)
WBC # BLD AUTO: 8.2 K/UL (ref 4.1–11.1)

## 2025-06-20 PROCEDURE — 6370000000 HC RX 637 (ALT 250 FOR IP): Performed by: INTERNAL MEDICINE

## 2025-06-20 PROCEDURE — 84100 ASSAY OF PHOSPHORUS: CPT

## 2025-06-20 PROCEDURE — 80053 COMPREHEN METABOLIC PANEL: CPT

## 2025-06-20 PROCEDURE — 85025 COMPLETE CBC W/AUTO DIFF WBC: CPT

## 2025-06-20 PROCEDURE — 83735 ASSAY OF MAGNESIUM: CPT

## 2025-06-20 PROCEDURE — 36415 COLL VENOUS BLD VENIPUNCTURE: CPT

## 2025-06-20 PROCEDURE — 2500000003 HC RX 250 WO HCPCS: Performed by: STUDENT IN AN ORGANIZED HEALTH CARE EDUCATION/TRAINING PROGRAM

## 2025-06-20 RX ORDER — ATORVASTATIN CALCIUM 80 MG/1
80 TABLET, FILM COATED ORAL DAILY
Qty: 30 TABLET | Refills: 3 | Status: SHIPPED | OUTPATIENT
Start: 2025-06-20

## 2025-06-20 RX ORDER — CLOPIDOGREL BISULFATE 75 MG/1
75 TABLET ORAL DAILY
Qty: 20 TABLET | Refills: 0 | Status: SHIPPED | OUTPATIENT
Start: 2025-06-20 | End: 2025-07-10

## 2025-06-20 RX ORDER — ASPIRIN 81 MG/1
81 TABLET, CHEWABLE ORAL DAILY
Qty: 30 TABLET | Refills: 3 | Status: SHIPPED | OUTPATIENT
Start: 2025-06-20

## 2025-06-20 RX ADMIN — SODIUM CHLORIDE, PRESERVATIVE FREE 10 ML: 5 INJECTION INTRAVENOUS at 08:36

## 2025-06-20 RX ADMIN — CLOPIDOGREL BISULFATE 75 MG: 75 TABLET, FILM COATED ORAL at 08:33

## 2025-06-20 RX ADMIN — ASPIRIN 81 MG: 81 TABLET, CHEWABLE ORAL at 08:34

## 2025-06-20 RX ADMIN — ATORVASTATIN CALCIUM 80 MG: 40 TABLET, FILM COATED ORAL at 08:33

## 2025-06-20 NOTE — PLAN OF CARE
Problem: Chronic Conditions and Co-morbidities  Goal: Patient's chronic conditions and co-morbidity symptoms are monitored and maintained or improved  6/19/2025 2159 by Elma Stockton RN  Outcome: Progressing  6/19/2025 1833 by Ramos Joel RN  Outcome: Progressing     Problem: Discharge Planning  Goal: Discharge to home or other facility with appropriate resources  6/19/2025 2159 by Elma Stockton RN  Outcome: Progressing  6/19/2025 1833 by Ramos Joel RN  Outcome: Progressing     Problem: ABCDS Injury Assessment  Goal: Absence of physical injury  6/19/2025 2159 by Elma Stockton RN  Outcome: Progressing  6/19/2025 1833 by Ramos Joel RN  Outcome: Progressing     Problem: Safety - Adult  Goal: Free from fall injury  6/19/2025 2159 by Elma Stockton RN  Outcome: Progressing  6/19/2025 1833 by Ramos Joel RN  Outcome: Progressing     Problem: Neurosensory - Adult  Goal: Achieves stable or improved neurological status  6/19/2025 2159 by Elma Stockton RN  Outcome: Progressing  6/19/2025 1833 by Ramos Joel RN  Outcome: Progressing  Goal: Achieves maximal functionality and self care  6/19/2025 2159 by Elma Stockton RN  Outcome: Progressing  6/19/2025 1833 by Ramos Joel RN  Outcome: Progressing     Problem: Cardiovascular - Adult  Goal: Maintains optimal cardiac output and hemodynamic stability  6/19/2025 2159 by Elma Stockton RN  Outcome: Progressing  6/19/2025 1833 by Ramos Joel RN  Outcome: Progressing  Goal: Absence of cardiac dysrhythmias or at baseline  6/19/2025 2159 by Elma Stockton RN  Outcome: Progressing  6/19/2025 1833 by Ramos Joel RN  Outcome: Progressing     Problem: Skin/Tissue Integrity - Adult  Goal: Skin integrity remains intact  6/19/2025 2159 by Elma Stockton RN  Outcome: Progressing  6/19/2025 1833 by Ramos Joel RN  Outcome: Progressing  Goal: Oral mucous membranes remain intact  6/19/2025 2159 by Elma Stockton RN  Outcome:

## 2025-06-20 NOTE — DISCHARGE SUMMARY
Discharge Summary    Name: Otis Andrade  [id number]  YOB: 1975 (Age: 49 y.o.)   Date of Admission: 6/18/2025  Date of Discharge: 6/20/2025  Attending Physician: Frankie Greer MD    Discharge Diagnosis:   Subacute right frontal lobe infarct         Consultations:  IP CONSULT TO CARDIOLOGY  IP CONSULT TO SOCIAL WORK  IP CONSULT TO CASE MANAGEMENT      Brief Admission History/Reason for Admission Per Humberto Amaya MD:   > 50 yo M with no diagnosed PMH of significance. Presented with blurred vision, generalized fatigue/malaise, jitteriness. Has R MCA territory stroke. TTE with possible L MV mass with other concerning findings. Needs PANKAJ done, Dr. Machuca recommends transfer here for PANKAJ. On med surg / tele bed there, accepted to same at 18:30 on 06/18/25. Consult cardiology on arrival, anticipate PANKAJ 6/19. .     Brief Hospital Course by Main Problems:     Subacute right frontal lobe infarct:   Seen on head CT.  Age-indeterminate but was not on head CT in April 2024.    Seen by teleneurology who recommends CVA workup including MRI with and without IV contrast and MRA head and neck.  MRI of the brain with late subacute infarct of the anterior right MCA territory.  MRA head and neck without any LVO noted.  Echocardiogram with findings of possible anterior mitral valve mass along with mitral valve leaflet lesion as well as elevated right ventricular pressures and dilatation requiring further investigation with PANKAJ.  Patient was transferred to [facility] for PANKAJ, which showed EF of 55 to 60%, no mitral valve mass, mild regurgitation no shunt  Patient was seen by neurologist at OSH.  And they have recommended DAPT for 21 days and then monotherapy with aspirin.  Discussed about need for outpatient event monitor  Blood pressure was elevated yesterday, and which has normalized/improved without any intervention.  Discharge Exam:  Patient seen and examined

## 2025-06-20 NOTE — PROGRESS NOTES
Hospitalist Progress Note    NAME:   Otis Andrade   : 1975   MRN: [id number]     Date/Time: 2025 9:35 AM  Patient PCP: None, None    Estimated discharge date:   Barriers:       Assessment / Plan:      Subacute right frontal lobe infarct:   Seen on head CT.  Age-indeterminate but was not on head CT in 2024.    Seen by teleneurology who recommends CVA workup including MRI with and without IV contrast and MRA head and neck.  MRI of the brain with late subacute infarct of the anterior right MCA territory.  MRA head and neck without any LVO noted.  Echocardiogram with findings of possible anterior mitral valve mass along with mitral valve leaflet lesion as well as elevated right ventricular pressures and dilatation requiring further investigation with PANKAJ.    Mildly elevated LDL of 111.  Neurologist at OSH recommends DAPT aspirin and Plavix for 21 days then monotherapy with aspirin  Outpatient event monitor  PANKAJ today, currently n.p.o.            Medical Decision Making:   I personally reviewed labs: CBC/BMP  I personally reviewed imaging:  Toxic drug monitoring:   Discussed case with: RN, patient, IDR        Code Status: Full code  DVT Prophylaxis:   GI Prophylaxis:    Subjective:     Chief Complaint / Reason for Physician Visit  \" Was transferred from outside hospital for transesophageal echocardiogram.  No concerns reported.  Reviewed telemetry sinus rhythm\".  Discussed with RN events overnight.       Objective:     VITALS:   Last 24hrs VS reviewed since prior progress note. Most recent are:  Patient Vitals for the past 24 hrs:   BP Temp Temp src Pulse Resp SpO2 Height Weight   25 0900 135/89 98.2 °F (36.8 °C) -- 62 15 100 % -- --   25 0719 -- -- -- -- -- -- -- 96.5 kg (212 lb 11.9 oz)   25 0510 -- -- -- 52 -- -- -- --   25 0503 (!) 141/90 97.7 °F (36.5 °C) Oral (!) 36 18 99 % -- --   25 0230 -- -- -- 56 -- -- -- --   25 0215 135/85 98 °F (36.7 °C) Oral 
  Pt sedated with 5 mg Versed and 75 mcg Fentanyl for PANKAJ (monitored sedation from 1301 to 1322)    
Attempted to schedule PCP hospital follow up. Patient does not have a PCP documented on the chart at this time. Pending patient discharge.  
End of Shift Note    Bedside shift change report given to  Florinda RN  (oncoming nurse) by Corinne Atkins RN .        Shift worked:  Nights   Shift summary and any significant changes:     Direct Admit from [facility] for positive TTE findings    Pt NPO after MN for PANKAJ today    Q3h vitals completed. NIH 0, and blurry vision/jitteriness remains resolved. No new symptoms.     HR on pulse oximetry (30s) not correlating with telemetry (50s - Sinus sampson with multiple PACs). Pt asymptomatic.        Concerns for physician to address:  See above   Zone phone for oncoming shift:   [phone]     Patient Information  Otis Andrade  49 y.o.  6/18/2025 10:21 PM by Humberto Amaya MD. 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       Core Measures:  CVA: yes  CHF: no  PNA: no    Activity:Level of Assistance: Independent  Number times ambulated in hallways past shift: 1  Number of times OOB to chair past shift: 1    Cardiac:   Cardiac Monitoring: yes, SB, box     Access:   Current line(s): PIV    Respiratory:   O2 Device: None (Room air)    GI:  Last BM (including prior to admit): 06/17/25  Current diet:  Diet NPO Exceptions are: Ice Chips, Sips of Water with Meds, Sips of Clear Liquids  Tolerating current diet: Yes    Pain Management:   Patient states pain is manageable on current regimen: yes    Skin:  Nelson Scale Score: 23  Interventions: dual skin   Pressure injury: no    Patient Safety:  Fall Score: Dinero Total Score: 35  Interventions: stay with me program, bed alarm  Self-release roll belt: No  Dexterity to release roll belt: N/A   (must document dexterity  here by 
End of Shift Note    Bedside shift change report given to  Florinda RN  (oncoming nurse) by Tong Ariza RN .        Shift worked:  Nights   Shift summary and any significant changes:     Direct Admit from [facility] for positive TTE findings    Pt NPO after MN for PANKAJ today    Q3h vitals completed. NIH 0, and blurry vision/jitteriness remains resolved. No new symptoms.     HR on pulse oximetry (30s) not correlating with telemetry (50s - Sinus sampson with multiple PACs). Pt asymptomatic.        Concerns for physician to address:  See above   Zone phone for oncoming shift:   [phone]     Patient Information  Otis Andrade  49 y.o.  6/18/2025 10:21 PM by Humberto Amaya MD. 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       Core Measures:  CVA: yes  CHF: no  PNA: no    Activity:Level of Assistance: Standby assist, set-up cues, supervision of patient - no hands on  Number times ambulated in hallways past shift: 1  Number of times OOB to chair past shift: 1    Cardiac:   Cardiac Monitoring: yes, SB, box     Access:   Current line(s): PIV    Respiratory:   O2 Device: None (Room air)    GI:  Last BM (including prior to admit): 06/19/25  Current diet:  ADULT DIET; Regular  Tolerating current diet: Yes    Pain Management:   Patient states pain is manageable on current regimen: yes    Skin:  Nelson Scale Score: 22  Interventions: dual skin   Pressure injury: no    Patient Safety:  Fall Score: Dinero Total Score: 35  Interventions: stay with me program, bed alarm  Self-release roll belt: No  Dexterity to release roll belt: N/A   (must document dexterity  here by stating Yes 
End of Shift Note    Bedside shift change report given to Carissa (oncoming nurse) by Elma Stockton, REINA .        Shift worked:  Nights   Shift summary and any significant changes:    Monitor BP overnight, pt stated goal at start of shift was for BP to remain improved to go home today. BP stable throughout shift.    No acute changes overnight.     Concerns for physician to address:  See above   Zone phone for oncoming shift:   [phone]     Patient Information  Otis Andrade  49 y.o.  6/18/2025 10:21 PM by Humberto Amaya MD. 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       Core Measures:  CVA: yes  CHF: no  PNA: no    Activity:Level of Assistance: Standby assist, set-up cues, supervision of patient - no hands on  Number times ambulated in hallways past shift: 1  Number of times OOB to chair past shift: 1    Cardiac:   Cardiac Monitoring: yes, SB, box     Access:   Current line(s): PIV    Respiratory:   O2 Device: None (Room air)    GI:  Last BM (including prior to admit): 06/19/25  Current diet:  ADULT DIET; Regular  Tolerating current diet: Yes    Pain Management:   Patient states pain is manageable on current regimen: yes    Skin:  Nelson Scale Score: 22  Interventions: dual skin   Pressure injury: no    Patient Safety:  Fall Score: Dinero Total Score: 35  Interventions: stay with me program, bed alarm  Self-release roll belt: No  Dexterity to release roll belt: N/A   (must document dexterity  here by stating Yes or No here, otherwise this is a restraint and must follow restraint documentation policy.)    DVT prophylaxis:  DVT prophylaxis: meds    Active Consults:  IP 
Noted transfer to [facility] for further work up with PANKAJ.  Pt was found to have a possible anterior right mitral valve mass along with mitral valve leaflet lesion as well as dilatation of the right ventricle on ECHO at [facility].  Will await results prior to start of OT services.   
Noted transfer to [facility] for further work up with PANKAJ.  Pt was found to have a possible anterior right mitral valve mass along with mitral valve leaflet lesion as well as dilatation of the right ventricle on ECHO at [facility]. Scheduled for PANKAJ today, will await results prior to start of PT services. Of note, pt was cleared by PT at [facility].    Odalis Hawk, PT   
Occupational Therapy    Orders and chart reviewed. Attempted to see pt, however, pt in bathroom independently completing ADLs. Family member at bedside reports all pt symptoms resolved and about to discharge. No formal eval indicated.    Deanne Palacios MS, OTR/L  
PT Note:    Chart reviewed in prep for PT, entered room and pt notified that he was dcing home today. Noted that he is ambulating with steady gait without device, pt and significant other report no difficulty with mobility/balance and decline need for PT assessment. Clear to dc home.    Odalis Hawk, PT   
Patient arrived to Non-Invasive Cardiology Lab for In  Patient PANKAJ Procedure. Staff introduced to patient. Patient identifiers verified with Name and Date of Birth. Procedure verified with patient. Consent forms reviewed and signed by patient or authorized representative and verified. Allergies verified. Patient informed of procedure and plan of care. Questions answered with review.     Patient on cardiac monitor, non-invasive blood pressure, SPO2 monitor. On RA. Patient is A&Ox3. Patient reports no complaints.    Patient on stretcher, in low position, with side rails up. Patient instructed to call for assistance as needed.      
Pt is off the floor for PANKAJ.  Will defer but continue to follow.   
TRANSFER - OUT REPORT:    Verbal report given to Christy (name) on Otis Andrade being transferred to Neuro (unit) for routine progression of patient care       Report consisted of patient's Situation, Background, Assessment and   Recommendations(SBAR).     Information from the following report(s) Recent Results and Med Rec Status was reviewed with the receiving nurse.    Opportunity for questions and clarification was provided.      Patient transported with:   Tech    
WORK  IP CONSULT TO CASE MANAGEMENT    Length of Stay:  Expected LOS: 2  Actual LOS: 1    Ramos Joel RN

## 2025-06-20 NOTE — DISCHARGE INSTRUCTIONS
HOSPITALIST DISCHARGE INSTRUCTIONS    NAME: Otis Andrade   :  1975   MRN:  [id number]     Date/Time:  2025 8:06 AM    ADMIT DATE: 2025   DISCHARGE DATE: 2025     Subacute right frontal lobe infarct    It is important that you take the medication exactly as they are prescribed.   Keep your medication in the bottles provided by the pharmacist and keep a list of the medication names, dosages, and times to be taken in your wallet.   Do not take other medications without consulting your doctor.       What to do at Home    Recommended diet:  cardiac diet    Recommended activity: activity as tolerated.  No driving for at least 6 months or unless cleared by neurologist.      If you have questions regarding the hospital related prescriptions or hospital related issues please call [facility] office at . You can always direct your questions to your primary care doctor if you are unable to reach your hospital physician; your PCP works as an extension of your hospital doctor just like your hospital doctor is an extension of your PCP for your time at the hospital ([facility])    If you experience any of the following symptoms then please call your primary care physician or return to the emergency room if you cannot get hold of your doctor:    Fever, chills, nausea, vomiting, or persistent diarrhea  Worsening weakness or new problems with your speech or balance  Dark stools or visible blood in your stools  New Leg swelling or shortness of breath as these could be signs of a clot    Additional Instructions:      Bring these papers with you to your follow up appointments. The papers will help your doctors be sure to continue the care plan from the hospital.              Information obtained by :  I understand that if any problems occur once I am at home I am to contact my physician.    I understand and acknowledge receipt of the instructions indicated above.

## 2025-06-20 NOTE — CARE COORDINATION
Pt is cleared for d/c from a CM standpoint.        06/20/25 0844   Services At/After Discharge   Transition of Care Consult (CM Consult) Discharge Planning   Services At/After Discharge None   Mode of Transport at Discharge Other (see comment)  (significant other at bedside)         CM acknowledged dc order.  CM met with pt at bedside to discuss.  Pt's significant other is here to transport pt home. CM offered to have PCP appt scheduled and pt politely declined.     Christina Ruano, PHIL, LCSW  Care Management, [facility]  [phone]

## 2025-06-25 ENCOUNTER — TELEPHONE (OUTPATIENT)
Age: 50
End: 2025-06-25

## 2025-06-25 NOTE — TELEPHONE ENCOUNTER
Patient called asking if Np Mack will fill out a form for his short term disability. Patient will be sending form to our office. Please advise. [phone]    *After form is completed, Patient asked that we please fax it to [phone] in attention to Mariana Montemayor.*

## 2025-06-27 ENCOUNTER — OFFICE VISIT (OUTPATIENT)
Dept: FAMILY MEDICINE CLINIC | Age: 50
End: 2025-06-27
Payer: COMMERCIAL

## 2025-06-27 VITALS
WEIGHT: 214 LBS | OXYGEN SATURATION: 98 % | BODY MASS INDEX: 28.36 KG/M2 | RESPIRATION RATE: 16 BRPM | HEIGHT: 73 IN | TEMPERATURE: 98.3 F | SYSTOLIC BLOOD PRESSURE: 132 MMHG | DIASTOLIC BLOOD PRESSURE: 80 MMHG | HEART RATE: 66 BPM

## 2025-06-27 DIAGNOSIS — R03.0 ELEVATED BLOOD PRESSURE READING: Primary | ICD-10-CM

## 2025-06-27 DIAGNOSIS — E78.49 OTHER HYPERLIPIDEMIA: ICD-10-CM

## 2025-06-27 DIAGNOSIS — I63.9 ACUTE CEREBROVASCULAR ACCIDENT (CVA) (HCC): ICD-10-CM

## 2025-06-27 DIAGNOSIS — I34.0 MITRAL VALVE INSUFFICIENCY, UNSPECIFIED ETIOLOGY: ICD-10-CM

## 2025-06-27 PROCEDURE — 99213 OFFICE O/P EST LOW 20 MIN: CPT

## 2025-06-27 RX ORDER — CHLORAL HYDRATE 500 MG
CAPSULE ORAL DAILY
COMMUNITY

## 2025-06-27 SDOH — ECONOMIC STABILITY: FOOD INSECURITY: WITHIN THE PAST 12 MONTHS, THE FOOD YOU BOUGHT JUST DIDN'T LAST AND YOU DIDN'T HAVE MONEY TO GET MORE.: NEVER TRUE

## 2025-06-27 SDOH — ECONOMIC STABILITY: FOOD INSECURITY: WITHIN THE PAST 12 MONTHS, YOU WORRIED THAT YOUR FOOD WOULD RUN OUT BEFORE YOU GOT MONEY TO BUY MORE.: NEVER TRUE

## 2025-06-27 ASSESSMENT — PATIENT HEALTH QUESTIONNAIRE - PHQ9
SUM OF ALL RESPONSES TO PHQ QUESTIONS 1-9: 0
2. FEELING DOWN, DEPRESSED OR HOPELESS: NOT AT ALL
1. LITTLE INTEREST OR PLEASURE IN DOING THINGS: NOT AT ALL

## 2025-06-27 NOTE — ASSESSMENT & PLAN NOTE
6/18/25 Lipid panel  Cholesterol 195  Triglycerides 48  HDL 74  .4  Atorvastatin 80 mg daily  Will recheck lipids in 3 months  Discussed Diet Modifications, Increased physical activity, reduced stress

## 2025-06-27 NOTE — ASSESSMENT & PLAN NOTE
PANKAJ revealed no mitral valve mass. Mild mitral regurgitation. Pt is wearing a heart monitor for 30 days. Follow up with Cardiologist Dr. Machuca June 30, 2025.

## 2025-06-27 NOTE — ASSESSMENT & PLAN NOTE
Age-indetermined right frontal lobe infarction.  No neurological deficits identified.  Follow up with Dr. MILKA Giron-Neurologist July 7, 2025  Plavix 75 mg daily for 20 days and daily ASA 81 mg

## 2025-06-27 NOTE — PROGRESS NOTES
Have you been to the ER, urgent care clinic since your last visit?  Hospitalized since your last visit?   YES - When: approximately 1  weeks ago.  [facility]    Have you seen or consulted any other health care providers outside our system since your last visit?   NO      “Have you had a colorectal cancer screening such as a colonoscopy/FIT/Cologuard?    NO    No colonoscopy on file  No cologuard on file  No FIT/FOBT on file   No flexible sigmoidoscopy on file         
edema.   Skin:     General: Skin is warm and dry.      Capillary Refill: Capillary refill takes less than 2 seconds.      Coloration: Skin is not jaundiced or pale.      Findings: No bruising, erythema, lesion or rash.   Neurological:      General: No focal deficit present.      Mental Status: He is alert and oriented to person, place, and time.      Cranial Nerves: No cranial nerve deficit.      Sensory: No sensory deficit.      Motor: No weakness.      Coordination: Coordination normal.      Gait: Gait normal.      Deep Tendon Reflexes: Reflexes normal.   Psychiatric:         Mood and Affect: Mood normal.         Behavior: Behavior normal.         Thought Content: Thought content normal.         Judgment: Judgment normal.          Vitals:    06/27/25 0811 06/27/25 0900   BP: (!) 146/84 132/80   BP Site: Right Upper Arm Right Upper Arm   Patient Position: Sitting Sitting   BP Cuff Size: Large Adult Large Adult   Pulse: 66    Resp: 16    Temp: 98.3 °F (36.8 °C)    TempSrc: Oral    SpO2: 98%    Weight: 97.1 kg (214 lb)    Height: 1.842 m (6' 0.5\")         No results found for this visit on 06/27/25.         Assessment & Plan    Assessment & Plan  Elevated blood pressure reading   BP elevated. Pt to monitor blood pressures at home twice a day, record in a log and bring to next visit in 4 weeks.         Cerebrovascular accident (CVA) (HCC)   Age-indetermined right frontal lobe infarction.  No neurological deficits identified.  Follow up with Dr. MILKA Giron-Neurologist July 7, 2025  Plavix 75 mg daily for 20 days and daily ASA 81 mg       Mitral valve insufficiency, unspecified etiology   PANKAJ revealed no mitral valve mass. Mild mitral regurgitation. Pt is wearing a heart monitor for 30 days. Follow up with Cardiologist Dr. Machuca June 30, 2025.         Other hyperlipidemia   6/18/25 Lipid panel  Cholesterol 195  Triglycerides 48  HDL 74  .4  Atorvastatin 80 mg daily  Will recheck lipids in 3 months  Discussed Diet

## 2025-07-07 ENCOUNTER — TELEMEDICINE (OUTPATIENT)
Age: 50
End: 2025-07-07
Payer: COMMERCIAL

## 2025-07-07 ENCOUNTER — CLINICAL DOCUMENTATION (OUTPATIENT)
Age: 50
End: 2025-07-07

## 2025-07-07 DIAGNOSIS — Z86.73 HISTORY OF STROKE: Primary | ICD-10-CM

## 2025-07-07 PROCEDURE — 99215 OFFICE O/P EST HI 40 MIN: CPT | Performed by: NURSE PRACTITIONER

## 2025-07-07 NOTE — ASSESSMENT & PLAN NOTE
Patient with no significant past medical history who had a visual disturbance while at work, he was working on his computer he described a visual disturbance and then he just felt off little bit jittery symptoms lasted for 15 or 20 minutes and then resolved.  MRI of the brain positive for subacute infarct versus encephalomalacia along the anterior right MCA territory     -MRI of the brain late subacute infarct versus developing encephalomalacia along the anterior right MCA territory.  -MRA of neck demonstrate no large vessel occlusion or significant luminal narrowing.  -MRA head: No obvious large vessel occlusion or significant luminal narrowing allowing for motion artifact.  -TTE: Possible anterior mitral valve mass along with mitral valve leaflet lesion as well as elevated right ventricular pressures and dilatation   -PANKAJ: EF of 55 to 60%, no mitral valve mass, mild regurgitation no shunt   -Labs: Total cholesterol 195, .4 hemoglobin A1c 5.2  -30 Day cardiac event monitor: Results pending.    Patient has completed 21 days of dual antiplatelet therapy and continues on monotherapy with aspirin 81 mg.  Continues on Lipitor 80 mg, goal LDL of 70  He had follow-up with cardiology.  He states they signed off and he needs no further follow-up at this time  Results of 30-day cardiac event monitor is still pending.  Patient remains at his neurological.  He has no deficits from his stroke.  A neurological standpoint he is cleared to drive.  He is cleared to return to work as of July 14, 2025.

## 2025-07-07 NOTE — PROGRESS NOTES
reliably assess due to telehealth visit  Respiratory: Upon observation equal chest expansion, nonlabored respirations  GI: Unable to assess due to telehealth visit  : Unable to assess due to telehealth visit  Psych/Mental Health:  Pleasant mood and affect    NEUROLOGICAL EXAMINATION:     Mental Status:   Formal testing was not completed. There was nothing concerning on general observation and discussion.  Patient is awake, alert, oriented x 3.  Speech is clear.  Speech pattern is fluent.  He is reliable historian following simple one-step as well as more complex two-step commands.  Pleasant mood and affect.  Good insight with regards to hospitalization and present medical condition.  Asked appropriate questions.    Cranial Nerves:  I: smell Not tested   II: visual fields Not assessed   II: pupils Unable to reliably assess due to limitations of telehealth   II: optic disc Not assessed   III,VII: ptosis none noted   III,IV,VI: extraocular muscles  Full ROM, gaze is conjugate   V: mastication Normal, speech is clear, swallowing without difficulty   V: facial light touch sensation  Denies any lateralizing sensory deficit or paresthesias to the face   VII: facial muscle function   symmetric   VIII: hearing symmetric   IX: soft palate elevation  Not assessed   XI: trapezius strength  Appears to be intact   XI: sternocleidomastoid strength Appears to be intact   XI: neck flexion strength  Intact   XII: tongue  Not assessed    -Motor: Not assessed as this is a telehealth visit.  Denies any lateralizing motor deficit or generalized weakness. Upon observation: normal bulk and tone is noted, do not appreciate tremor.     -Sensation: Patient denies any numbness or tingling in the bilateral upper extremities as well as bilateral lower extremities.   -Cerebellar Testing: Denies any feelings of imbalance.  Patient's movements appear to be smooth and purposeful   -DTRs: Unable to assess due to telehealth visit   -Gait: Ambulates

## 2025-07-08 NOTE — TELEPHONE ENCOUNTER
I inquired about the [employer] form and patient asked if Ag Giron would be able to just print out the form and sign it. Patient states he really needs this form completed asap as he has not been paid. Please advise.     *After form is completed, Patient asked that we please fax it to [phone] in attention to Mariana Montemayor.*     Patient also asked if Ag Giron emailed his return to work note to MATILDE Puga (Me) as discussed at his VV yesterday. Advised I had not received anything so I would check with Ag Giron on this as well. Please advise.

## 2025-07-09 ENCOUNTER — CLINICAL DOCUMENTATION (OUTPATIENT)
Age: 50
End: 2025-07-09

## 2025-07-09 NOTE — TELEPHONE ENCOUNTER
Patient called back. Spoke with patient and we determined that we are missing the page on the [employer] form that the provider needs to complete and sign. Patient is resending the [employer] form to us via fax, as it is not allowing the form in it's entirety to be sent via email. I advised I would let SPENCER Giron and her nurse, Adia, know.     Once we receive the [employer] form, patient stated that the number we need to fax the form to is at the top of the page. We may call him with any questions.     *I originally emailed Spencer Giron the [employer] form but had not realized anything was missing.*    Patient also stated he got in contact with the [employer] and he was told to give us this fax number and claim number so we can send his FMLA forms.  Fax #: [phone] Claim number: [id number].

## 2025-07-09 NOTE — TELEPHONE ENCOUNTER
Called patient (verified with 2 identifiers)     Informed him that FMLA forms for both pt and spouse have been completed. Asked pt how he would like to obtain the forms - that I could mail them or he could come into the office to pick them up.     He states that there is a fax number on the form. I advised the form states \"DO NOT SEND COMPLETED FORM TO DEPARTMENT OF LABOR. RETURN TO THE PATIENT\"     He states he will call the [employer] and see if he can obtain a fax number.     I stated I could provide the number to the [employer] Help Desk to assist in setting that up and that I could upload the documents that way. Pt declined.     He inquired about the [employer] Form. I stated that I did not see anything uploaded to media. He states that he had sent the form to Vivien.     I advised that Reyna states \"If this is in regards to [employer] form, I cannot edit the form - please clarify with the patient what he needs.\"     Will contact Reyna and Vivien regarding what is needed for this form and if I am able to assist.

## 2025-07-09 NOTE — PROGRESS NOTES
Faxed [employer] disability forms and work note from Reyna to Mariana Chandagata at [phone] as requested by patient.   Received fax confirmation.     Faxed [place] disability forms to the [place] as requested by patient at [phone]. Put claim # [id number]    Asked patient what he wanted done with FMLA forms for his spouse. He advised to wait to fax these. Advised I would send to the  to be scanned into patients chart to refer back to if needed.

## 2025-07-09 NOTE — TELEPHONE ENCOUNTER
Called patient and informed him his disability forms have been faxed. Please see clinical documentation for more information. All forms sent to  for scanning.

## 2025-07-10 NOTE — TELEPHONE ENCOUNTER
Received message from Reyna advising to fax updated letter with corrected return to work date. Faxed to [phone] and received confirmation.

## 2025-07-11 ENCOUNTER — TELEPHONE (OUTPATIENT)
Age: 50
End: 2025-07-11

## 2025-07-11 NOTE — TELEPHONE ENCOUNTER
Patient called stating that Mariana Brennan advised that she did not receive the [employer] disability form. Patient asked if we could please refax this to her.     Fax #: [phone]

## 2025-07-11 NOTE — TELEPHONE ENCOUNTER
Faxed [employer] form to the number provided by patient. Attn: Mariana Montemayor at [phone]. Received fax confirmation. Sent confirmation to  for scanning.

## 2025-07-24 ENCOUNTER — CLINICAL DOCUMENTATION (OUTPATIENT)
Age: 50
End: 2025-07-24

## 2025-07-24 NOTE — PROGRESS NOTES
Received request for medical records from [facility] Claims Department for all records from 01/01/25 to present. Attached signed release of medical information form.      Faxed office note from 07/07/2025 back to [facility] Claims Dept at [phone]  Received fax confirmation.   Sent confirmation to  for scanning.

## 2025-07-25 ENCOUNTER — OFFICE VISIT (OUTPATIENT)
Dept: FAMILY MEDICINE CLINIC | Age: 50
End: 2025-07-25
Payer: COMMERCIAL

## 2025-07-25 VITALS
TEMPERATURE: 98.1 F | HEIGHT: 73 IN | BODY MASS INDEX: 27.41 KG/M2 | WEIGHT: 206.8 LBS | SYSTOLIC BLOOD PRESSURE: 132 MMHG | RESPIRATION RATE: 16 BRPM | HEART RATE: 84 BPM | DIASTOLIC BLOOD PRESSURE: 69 MMHG | OXYGEN SATURATION: 98 %

## 2025-07-25 DIAGNOSIS — R03.0 BORDERLINE HIGH BLOOD PRESSURE: Primary | ICD-10-CM

## 2025-07-25 DIAGNOSIS — E78.2 MIXED HYPERLIPIDEMIA: ICD-10-CM

## 2025-07-25 PROCEDURE — 99213 OFFICE O/P EST LOW 20 MIN: CPT

## 2025-07-25 RX ORDER — CLOPIDOGREL BISULFATE 75 MG/1
75 TABLET ORAL DAILY
COMMUNITY
End: 2025-07-25

## 2025-07-25 ASSESSMENT — PATIENT HEALTH QUESTIONNAIRE - PHQ9
SUM OF ALL RESPONSES TO PHQ QUESTIONS 1-9: 0
2. FEELING DOWN, DEPRESSED OR HOPELESS: NOT AT ALL
SUM OF ALL RESPONSES TO PHQ QUESTIONS 1-9: 0
SUM OF ALL RESPONSES TO PHQ QUESTIONS 1-9: 0
1. LITTLE INTEREST OR PLEASURE IN DOING THINGS: NOT AT ALL
SUM OF ALL RESPONSES TO PHQ QUESTIONS 1-9: 0

## 2025-07-25 NOTE — PROGRESS NOTES
Have you been to the ER, urgent care clinic since your last visit?  Hospitalized since your last visit?   NO    Have you seen or consulted any other health care providers outside our system since your last visit?   YES - When: approximately 4  weeks ago.  [facility] and Why: [facility]      “Have you had a colorectal cancer screening such as a colonoscopy/FIT/Cologuard?    NO    No colonoscopy on file  No cologuard on file  No FIT/FOBT on file   No flexible sigmoidoscopy on file

## 2025-07-25 NOTE — PROGRESS NOTES
Otis Andrade (:  1975) is a 49 y.o. male,Established patient, here for evaluation of the following chief complaint(s):  Hypertension (1 month follow up)      Subjective   History of Present Illness  Mr. Andrade is a 49 year old male who presents today for blood pressure check due to elevated blood pressure on his last visit. He reports he feels well but he has a lot Family stress going on, he's closing on his home soon, I worry about my health and his significant other job closed recently. He  trying to deal with the stress by working out. He wants to try to work through his stress/anxiety without medication right now.   intentional wt loss,  No sob, CP, dizziness, numbness or tingling, headaches, blurry vision, unintentional weight loss.  Next appointment with Neurologist next follow up . Dr. Ray released him to go back to work. Cardiologist Brigette released from care, follow up in 2 years for echo.  No Known Allergies    Current Outpatient Medications   Medication Sig Dispense Refill    Omega-3 Fatty Acids (FISH OIL) 1000 MG capsule Take by mouth daily      aspirin 81 MG chewable tablet Take 1 tablet by mouth daily 30 tablet 3    atorvastatin (LIPITOR) 80 MG tablet Take 1 tablet by mouth daily 30 tablet 3    clopidogrel (PLAVIX) 75 MG tablet Take 1 tablet by mouth daily (Patient not taking: Reported on 2025)      Multiple Vitamin (MULTIVITAMIN ADULT PO) Take 1 capsule by mouth daily (Patient not taking: Reported on 2025)       No current facility-administered medications for this visit.        Past Medical History:   Diagnosis Date    Testicular mass 2022       Family History   Problem Relation Age of Onset    High Cholesterol Mother     Stroke Mother     Cancer Father         lung cancer    No Known Problems Sister     Stroke Sister     No Known Problems Brother     No Known Problems Brother     No Known Problems Brother        Review of Systems    A comprehensive review of

## 2025-08-06 ENCOUNTER — HOSPITAL ENCOUNTER (OUTPATIENT)
Facility: HOSPITAL | Age: 50
Discharge: HOME | End: 2025-08-09

## 2025-08-06 ENCOUNTER — TELEMEDICINE (OUTPATIENT)
Age: 50
End: 2025-08-06
Payer: COMMERCIAL

## 2025-08-06 DIAGNOSIS — I63.9 CRYPTOGENIC STROKE (HCC): Primary | ICD-10-CM

## 2025-08-06 DIAGNOSIS — I63.9 CRYPTOGENIC STROKE (HCC): ICD-10-CM

## 2025-08-06 PROCEDURE — 99215 OFFICE O/P EST HI 40 MIN: CPT | Performed by: NURSE PRACTITIONER

## 2025-08-06 ASSESSMENT — PATIENT HEALTH QUESTIONNAIRE - PHQ9
SUM OF ALL RESPONSES TO PHQ QUESTIONS 1-9: 0
2. FEELING DOWN, DEPRESSED OR HOPELESS: NOT AT ALL
SUM OF ALL RESPONSES TO PHQ QUESTIONS 1-9: 0
1. LITTLE INTEREST OR PLEASURE IN DOING THINGS: NOT AT ALL

## 2025-08-08 LAB
APTT SCREEN TO CONFIRM RATIO: 0.97 RATIO (ref 0–1.34)
AT III AG PPP IA-ACNC: 103 % (ref 72–124)
AT III PPP CHRO-ACNC: 108 % (ref 75–135)
CONFIRM APTT/NORMAL: 43 SEC (ref 0–47.6)
LA 2 SCREEN W REFLEX-IMP: NORMAL
PROT C PPP-ACNC: 89 % (ref 73–180)
PROT S ACT/NOR PPP: 94 % (ref 63–140)
PROT S AG ACT/NOR PPP IA: 102 % (ref 60–150)
PROT S FREE AG ACT/NOR PPP IA: 108 % (ref 61–136)
SCREEN APTT: 36.8 SEC (ref 0–43.5)
SCREEN DRVVT: 35.9 SEC (ref 0–47)
THROMBIN TIME: 19.2 SEC (ref 0–23)

## 2025-08-11 ENCOUNTER — TELEPHONE (OUTPATIENT)
Age: 50
End: 2025-08-11

## 2025-08-11 LAB
F5 P.R506Q BLD/T QL: NORMAL
IMP & REVIEW OF LAB RESULTS: NORMAL

## 2025-08-19 ENCOUNTER — SCHEDULED TELEPHONE ENCOUNTER (OUTPATIENT)
Age: 50
End: 2025-08-19
Payer: COMMERCIAL

## 2025-08-19 DIAGNOSIS — I63.9 CRYPTOGENIC STROKE (HCC): Primary | ICD-10-CM

## 2025-08-19 PROCEDURE — 99448 NTRPROF PH1/NTRNET/EHR 21-30: CPT | Performed by: NURSE PRACTITIONER
